# Patient Record
Sex: FEMALE | Race: WHITE | NOT HISPANIC OR LATINO | Employment: OTHER | URBAN - METROPOLITAN AREA
[De-identification: names, ages, dates, MRNs, and addresses within clinical notes are randomized per-mention and may not be internally consistent; named-entity substitution may affect disease eponyms.]

---

## 2017-12-01 ENCOUNTER — CONVERSION ENCOUNTER (OUTPATIENT)
Dept: MAMMOGRAPHY | Facility: HOSPITAL | Age: 62
End: 2017-12-01

## 2018-01-02 ENCOUNTER — CONVERSION ENCOUNTER (OUTPATIENT)
Dept: SURGERY | Facility: CLINIC | Age: 63
End: 2018-01-02

## 2018-01-02 ENCOUNTER — OFFICE VISIT CONVERTED (OUTPATIENT)
Dept: SURGERY | Facility: CLINIC | Age: 63
End: 2018-01-02
Attending: SURGERY

## 2018-03-08 ENCOUNTER — OFFICE VISIT CONVERTED (OUTPATIENT)
Dept: GASTROENTEROLOGY | Facility: CLINIC | Age: 63
End: 2018-03-08
Attending: PHYSICIAN ASSISTANT

## 2018-05-30 ENCOUNTER — OFFICE VISIT CONVERTED (OUTPATIENT)
Dept: GASTROENTEROLOGY | Facility: CLINIC | Age: 63
End: 2018-05-30
Attending: PHYSICIAN ASSISTANT

## 2019-01-10 ENCOUNTER — HOSPITAL ENCOUNTER (OUTPATIENT)
Dept: MAMMOGRAPHY | Facility: HOSPITAL | Age: 64
Discharge: HOME | End: 2019-01-10
Attending: INTERNAL MEDICINE

## 2019-04-04 ENCOUNTER — HOSPITAL ENCOUNTER (OUTPATIENT)
Dept: LAB | Facility: HOSPITAL | Age: 64
Discharge: HOME | End: 2019-04-04
Attending: INTERNAL MEDICINE

## 2019-04-04 LAB
APPEARANCE UR: CLEAR
BILIRUB UR QL: NEGATIVE
COLOR UR: YELLOW
CONV BACTERIA: NEGATIVE
CONV COLLECTION SOURCE (UA): ABNORMAL
CONV HYALINE CASTS IN URINE MICRO: ABNORMAL /[LPF]
CONV UROBILINOGEN IN URINE BY AUTOMATED TEST STRIP: 0.2 {EHRLICHU}/DL (ref 0.1–1)
GLUCOSE UR QL: NEGATIVE MG/DL
HGB UR QL STRIP: NEGATIVE
KETONES UR QL STRIP: NEGATIVE MG/DL
LEUKOCYTE ESTERASE UR QL STRIP: ABNORMAL
NITRITE UR QL STRIP: NEGATIVE
PH UR STRIP.AUTO: 5.5 [PH] (ref 5–8)
PROT UR QL: NEGATIVE MG/DL
RBC #/AREA URNS HPF: ABNORMAL /[HPF]
SP GR UR: 1.02 (ref 1–1.03)
WBC #/AREA URNS HPF: ABNORMAL /[HPF]

## 2019-04-06 LAB — BACTERIA UR CULT: NORMAL

## 2019-04-23 ENCOUNTER — HOSPITAL ENCOUNTER (OUTPATIENT)
Dept: CT IMAGING | Facility: HOSPITAL | Age: 64
Discharge: HOME | End: 2019-04-23
Attending: INTERNAL MEDICINE

## 2019-04-23 LAB
CREAT BLD-MCNC: 0.8 MG/DL (ref 0.6–1.4)
GFR SERPLBLD BASED ON 1.73 SQ M-ARVRAT: >60 ML/MIN/{1.73_M2}

## 2019-09-10 ENCOUNTER — HOSPITAL ENCOUNTER (OUTPATIENT)
Dept: SURGERY | Facility: HOSPITAL | Age: 64
Setting detail: HOSPITAL OUTPATIENT SURGERY
Discharge: HOME | End: 2019-09-10
Attending: OPHTHALMOLOGY

## 2019-10-30 ENCOUNTER — HOSPITAL ENCOUNTER (OUTPATIENT)
Dept: URGENT CARE | Facility: CLINIC | Age: 64
Discharge: HOME | End: 2019-10-30
Attending: EMERGENCY MEDICINE

## 2020-03-07 ENCOUNTER — HOSPITAL ENCOUNTER (OUTPATIENT)
Dept: OTHER | Facility: HOSPITAL | Age: 65
Discharge: HOME | End: 2020-03-07
Attending: INTERNAL MEDICINE

## 2020-03-07 LAB
25(OH)D3 SERPL-MCNC: 47 NG/ML (ref 30–100)
ALBUMIN SERPL-MCNC: 4.5 G/DL (ref 3.5–5)
ALBUMIN/GLOB SERPL: 1.7 {RATIO} (ref 1.4–2.6)
ALP SERPL-CCNC: 51 U/L (ref 43–160)
ALT SERPL-CCNC: 15 U/L (ref 10–40)
ANION GAP SERPL CALC-SCNC: 19 MMOL/L (ref 8–19)
AST SERPL-CCNC: 23 U/L (ref 15–50)
BASOPHILS # BLD AUTO: 0.04 10*3/UL (ref 0–0.2)
BASOPHILS NFR BLD AUTO: 0.7 % (ref 0–3)
BILIRUB SERPL-MCNC: 0.27 MG/DL (ref 0.2–1.3)
BUN SERPL-MCNC: 9 MG/DL (ref 5–25)
BUN/CREAT SERPL: 12 {RATIO} (ref 6–20)
CALCIUM SERPL-MCNC: 9.1 MG/DL (ref 8.7–10.4)
CHLORIDE SERPL-SCNC: 105 MMOL/L (ref 99–111)
CHOLEST SERPL-MCNC: 158 MG/DL (ref 107–200)
CHOLEST/HDLC SERPL: 3.7 {RATIO} (ref 3–6)
CONV ABS IMM GRAN: 0.01 10*3/UL (ref 0–0.2)
CONV CO2: 23 MMOL/L (ref 22–32)
CONV IMMATURE GRAN: 0.2 % (ref 0–1.8)
CONV TOTAL PROTEIN: 7.2 G/DL (ref 6.3–8.2)
CREAT UR-MCNC: 0.75 MG/DL (ref 0.5–0.9)
DEPRECATED RDW RBC AUTO: 43.9 FL (ref 36.4–46.3)
EOSINOPHIL # BLD AUTO: 0.16 10*3/UL (ref 0–0.7)
EOSINOPHIL # BLD AUTO: 2.7 % (ref 0–7)
ERYTHROCYTE [DISTWIDTH] IN BLOOD BY AUTOMATED COUNT: 12.2 % (ref 11.7–14.4)
FOLATE SERPL-MCNC: >20 NG/ML (ref 4.8–20)
GFR SERPLBLD BASED ON 1.73 SQ M-ARVRAT: >60 ML/MIN/{1.73_M2}
GLOBULIN UR ELPH-MCNC: 2.7 G/DL (ref 2–3.5)
GLUCOSE SERPL-MCNC: 89 MG/DL (ref 65–99)
HCT VFR BLD AUTO: 40.8 % (ref 37–47)
HDLC SERPL-MCNC: 43 MG/DL (ref 40–60)
HGB BLD-MCNC: 13.4 G/DL (ref 12–16)
LDLC SERPL CALC-MCNC: 65 MG/DL (ref 70–100)
LYMPHOCYTES # BLD AUTO: 1.8 10*3/UL (ref 1–5)
LYMPHOCYTES NFR BLD AUTO: 30.7 % (ref 20–45)
MCH RBC QN AUTO: 31.9 PG (ref 27–31)
MCHC RBC AUTO-ENTMCNC: 32.8 G/DL (ref 33–37)
MCV RBC AUTO: 97.1 FL (ref 81–99)
MONOCYTES # BLD AUTO: 0.54 10*3/UL (ref 0.2–1.2)
MONOCYTES NFR BLD AUTO: 9.2 % (ref 3–10)
NEUTROPHILS # BLD AUTO: 3.32 10*3/UL (ref 2–8)
NEUTROPHILS NFR BLD AUTO: 56.5 % (ref 30–85)
NRBC CBCN: 0 % (ref 0–0.7)
OSMOLALITY SERPL CALC.SUM OF ELEC: 294 MOSM/KG (ref 273–304)
PLATELET # BLD AUTO: 195 10*3/UL (ref 130–400)
PMV BLD AUTO: 12.5 FL (ref 9.4–12.3)
POTASSIUM SERPL-SCNC: 4 MMOL/L (ref 3.5–5.3)
RBC # BLD AUTO: 4.2 10*6/UL (ref 4.2–5.4)
SODIUM SERPL-SCNC: 143 MMOL/L (ref 135–147)
T4 FREE SERPL-MCNC: 0.9 NG/DL (ref 0.9–1.8)
TRIGL SERPL-MCNC: 251 MG/DL (ref 40–150)
TSH SERPL-ACNC: 1.08 M[IU]/L (ref 0.27–4.2)
VIT B12 SERPL-MCNC: 580 PG/ML (ref 211–911)
VLDLC SERPL-MCNC: 50 MG/DL (ref 5–37)
WBC # BLD AUTO: 5.87 10*3/UL (ref 4.8–10.8)

## 2020-06-23 ENCOUNTER — HOSPITAL ENCOUNTER (OUTPATIENT)
Dept: MAMMOGRAPHY | Facility: HOSPITAL | Age: 65
Discharge: HOME | End: 2020-06-23
Attending: INTERNAL MEDICINE

## 2020-08-11 ENCOUNTER — HOSPITAL ENCOUNTER (OUTPATIENT)
Dept: LAB | Facility: HOSPITAL | Age: 65
Discharge: HOME | End: 2020-08-11
Attending: INTERNAL MEDICINE

## 2020-08-11 LAB
ALBUMIN SERPL-MCNC: 4.4 G/DL (ref 3.5–5)
ALBUMIN/GLOB SERPL: 1.5 {RATIO} (ref 1.4–2.6)
ALP SERPL-CCNC: 53 U/L (ref 43–160)
ALT SERPL-CCNC: 14 U/L (ref 10–40)
ANION GAP SERPL CALC-SCNC: 14 MMOL/L (ref 8–19)
AST SERPL-CCNC: 22 U/L (ref 15–50)
BILIRUB SERPL-MCNC: 0.33 MG/DL (ref 0.2–1.3)
BUN SERPL-MCNC: 9 MG/DL (ref 5–25)
BUN/CREAT SERPL: 11 {RATIO} (ref 6–20)
CALCIUM SERPL-MCNC: 9.9 MG/DL (ref 8.7–10.4)
CHLORIDE SERPL-SCNC: 104 MMOL/L (ref 99–111)
CHOLEST SERPL-MCNC: 155 MG/DL (ref 107–200)
CHOLEST/HDLC SERPL: 3.7 {RATIO} (ref 3–6)
CONV CO2: 27 MMOL/L (ref 22–32)
CONV TOTAL PROTEIN: 7.3 G/DL (ref 6.3–8.2)
CREAT UR-MCNC: 0.79 MG/DL (ref 0.5–0.9)
GFR SERPLBLD BASED ON 1.73 SQ M-ARVRAT: >60 ML/MIN/{1.73_M2}
GLOBULIN UR ELPH-MCNC: 2.9 G/DL (ref 2–3.5)
GLUCOSE SERPL-MCNC: 87 MG/DL (ref 65–99)
HDLC SERPL-MCNC: 42 MG/DL (ref 40–60)
LDLC SERPL CALC-MCNC: 71 MG/DL (ref 70–100)
OSMOLALITY SERPL CALC.SUM OF ELEC: 290 MOSM/KG (ref 273–304)
POTASSIUM SERPL-SCNC: 4.1 MMOL/L (ref 3.5–5.3)
SODIUM SERPL-SCNC: 141 MMOL/L (ref 135–147)
TRIGL SERPL-MCNC: 208 MG/DL (ref 40–150)
VLDLC SERPL-MCNC: 42 MG/DL (ref 5–37)

## 2020-08-17 ENCOUNTER — HOSPITAL ENCOUNTER (OUTPATIENT)
Dept: LAB | Facility: HOSPITAL | Age: 65
Discharge: HOME | End: 2020-08-17
Attending: INTERNAL MEDICINE

## 2020-08-17 LAB
BASOPHILS # BLD AUTO: 0.05 10*3/UL (ref 0–0.2)
BASOPHILS NFR BLD AUTO: 0.8 % (ref 0–3)
CONV ABS IMM GRAN: 0.01 10*3/UL (ref 0–0.2)
CONV IMMATURE GRAN: 0.2 % (ref 0–1.8)
DEPRECATED RDW RBC AUTO: 43.8 FL (ref 36.4–46.3)
EOSINOPHIL # BLD AUTO: 0.05 10*3/UL (ref 0–0.7)
EOSINOPHIL # BLD AUTO: 0.8 % (ref 0–7)
ERYTHROCYTE [DISTWIDTH] IN BLOOD BY AUTOMATED COUNT: 12.1 % (ref 11.7–14.4)
HCT VFR BLD AUTO: 42.5 % (ref 37–47)
HGB BLD-MCNC: 13.8 G/DL (ref 12–16)
LYMPHOCYTES # BLD AUTO: 1.86 10*3/UL (ref 1–5)
LYMPHOCYTES NFR BLD AUTO: 30.1 % (ref 20–45)
MCH RBC QN AUTO: 31.8 PG (ref 27–31)
MCHC RBC AUTO-ENTMCNC: 32.5 G/DL (ref 33–37)
MCV RBC AUTO: 97.9 FL (ref 81–99)
MONOCYTES # BLD AUTO: 0.53 10*3/UL (ref 0.2–1.2)
MONOCYTES NFR BLD AUTO: 8.6 % (ref 3–10)
NEUTROPHILS # BLD AUTO: 3.68 10*3/UL (ref 2–8)
NEUTROPHILS NFR BLD AUTO: 59.5 % (ref 30–85)
NRBC CBCN: 0 % (ref 0–0.7)
PLATELET # BLD AUTO: 192 10*3/UL (ref 130–400)
PMV BLD AUTO: 13 FL (ref 9.4–12.3)
RBC # BLD AUTO: 4.34 10*6/UL (ref 4.2–5.4)
WBC # BLD AUTO: 6.18 10*3/UL (ref 4.8–10.8)

## 2020-08-18 ENCOUNTER — HOSPITAL ENCOUNTER (OUTPATIENT)
Dept: GENERAL RADIOLOGY | Facility: HOSPITAL | Age: 65
Discharge: HOME | End: 2020-08-18
Attending: INTERNAL MEDICINE

## 2020-08-24 ENCOUNTER — HOSPITAL ENCOUNTER (OUTPATIENT)
Dept: PREADMISSION TESTING | Facility: HOSPITAL | Age: 65
Discharge: HOME | End: 2020-08-24
Attending: INTERNAL MEDICINE

## 2020-08-25 LAB — SARS-COV-2 RNA SPEC QL NAA+PROBE: NOT DETECTED

## 2020-08-28 ENCOUNTER — HOSPITAL ENCOUNTER (OUTPATIENT)
Dept: CARDIOLOGY | Facility: HOSPITAL | Age: 65
Discharge: HOME | End: 2020-08-28
Attending: INTERNAL MEDICINE

## 2020-09-16 ENCOUNTER — HOSPITAL ENCOUNTER (OUTPATIENT)
Dept: URGENT CARE | Facility: CLINIC | Age: 65
Discharge: HOME | End: 2020-09-16
Attending: FAMILY MEDICINE

## 2020-09-16 LAB — GLUCOSE BLD-MCNC: 83 MG/DL (ref 65–99)

## 2020-10-22 ENCOUNTER — HOSPITAL ENCOUNTER (OUTPATIENT)
Dept: URGENT CARE | Facility: CLINIC | Age: 65
Discharge: HOME | End: 2020-10-22
Attending: PHYSICIAN ASSISTANT

## 2020-11-22 ENCOUNTER — HOSPITAL ENCOUNTER (OUTPATIENT)
Dept: URGENT CARE | Facility: CLINIC | Age: 65
Discharge: HOME | End: 2020-11-22

## 2020-11-27 LAB — SARS-COV-2 RNA SPEC QL NAA+PROBE: DETECTED

## 2021-03-25 ENCOUNTER — HOSPITAL ENCOUNTER (OUTPATIENT)
Dept: CARDIOLOGY | Facility: HOSPITAL | Age: 66
Discharge: HOME | End: 2021-03-25
Attending: INTERNAL MEDICINE

## 2021-03-30 ENCOUNTER — HOSPITAL ENCOUNTER (OUTPATIENT)
Dept: LAB | Facility: HOSPITAL | Age: 66
Discharge: HOME | End: 2021-03-30
Attending: INTERNAL MEDICINE

## 2021-03-30 LAB
25(OH)D3 SERPL-MCNC: 43.8 NG/ML (ref 30–100)
ALBUMIN SERPL-MCNC: 4.4 G/DL (ref 3.5–5)
ALBUMIN/GLOB SERPL: 1.4 {RATIO} (ref 1.4–2.6)
ALP SERPL-CCNC: 47 U/L (ref 43–160)
ALT SERPL-CCNC: 12 U/L (ref 10–40)
ANION GAP SERPL CALC-SCNC: 13 MMOL/L (ref 8–19)
AST SERPL-CCNC: 20 U/L (ref 15–50)
BASOPHILS # BLD AUTO: 0.04 10*3/UL (ref 0–0.2)
BASOPHILS NFR BLD AUTO: 0.8 % (ref 0–3)
BILIRUB SERPL-MCNC: 0.39 MG/DL (ref 0.2–1.3)
BUN SERPL-MCNC: 12 MG/DL (ref 5–25)
BUN/CREAT SERPL: 15 {RATIO} (ref 6–20)
CALCIUM SERPL-MCNC: 9.3 MG/DL (ref 8.7–10.4)
CHLORIDE SERPL-SCNC: 103 MMOL/L (ref 99–111)
CHOLEST SERPL-MCNC: 144 MG/DL (ref 107–200)
CHOLEST/HDLC SERPL: 2.9 {RATIO} (ref 3–6)
CONV ABS IMM GRAN: 0.01 10*3/UL (ref 0–0.2)
CONV CO2: 26 MMOL/L (ref 22–32)
CONV IMMATURE GRAN: 0.2 % (ref 0–1.8)
CONV TOTAL PROTEIN: 7.5 G/DL (ref 6.3–8.2)
CREAT UR-MCNC: 0.78 MG/DL (ref 0.5–0.9)
DEPRECATED RDW RBC AUTO: 43.2 FL (ref 36.4–46.3)
EOSINOPHIL # BLD AUTO: 0.08 10*3/UL (ref 0–0.7)
EOSINOPHIL # BLD AUTO: 1.7 % (ref 0–7)
ERYTHROCYTE [DISTWIDTH] IN BLOOD BY AUTOMATED COUNT: 12.2 % (ref 11.7–14.4)
FOLATE SERPL-MCNC: >20 NG/ML (ref 4.8–20)
GFR SERPLBLD BASED ON 1.73 SQ M-ARVRAT: >60 ML/MIN/{1.73_M2}
GLOBULIN UR ELPH-MCNC: 3.1 G/DL (ref 2–3.5)
GLUCOSE SERPL-MCNC: 85 MG/DL (ref 65–99)
HCT VFR BLD AUTO: 40.4 % (ref 37–47)
HDLC SERPL-MCNC: 49 MG/DL (ref 40–60)
HGB BLD-MCNC: 13.3 G/DL (ref 12–16)
LDLC SERPL CALC-MCNC: 71 MG/DL (ref 70–100)
LYMPHOCYTES # BLD AUTO: 1.65 10*3/UL (ref 1–5)
LYMPHOCYTES NFR BLD AUTO: 34.3 % (ref 20–45)
MCH RBC QN AUTO: 32 PG (ref 27–31)
MCHC RBC AUTO-ENTMCNC: 32.9 G/DL (ref 33–37)
MCV RBC AUTO: 97.1 FL (ref 81–99)
MONOCYTES # BLD AUTO: 0.5 10*3/UL (ref 0.2–1.2)
MONOCYTES NFR BLD AUTO: 10.4 % (ref 3–10)
NEUTROPHILS # BLD AUTO: 2.53 10*3/UL (ref 2–8)
NEUTROPHILS NFR BLD AUTO: 52.6 % (ref 30–85)
NRBC CBCN: 0 % (ref 0–0.7)
OSMOLALITY SERPL CALC.SUM OF ELEC: 285 MOSM/KG (ref 273–304)
PLATELET # BLD AUTO: 184 10*3/UL (ref 130–400)
PMV BLD AUTO: 12.6 FL (ref 9.4–12.3)
POTASSIUM SERPL-SCNC: 3.8 MMOL/L (ref 3.5–5.3)
RBC # BLD AUTO: 4.16 10*6/UL (ref 4.2–5.4)
SODIUM SERPL-SCNC: 138 MMOL/L (ref 135–147)
T4 FREE SERPL-MCNC: 1.2 NG/DL (ref 0.9–1.8)
TRIGL SERPL-MCNC: 122 MG/DL (ref 40–150)
TSH SERPL-ACNC: 2.49 M[IU]/L (ref 0.27–4.2)
VIT B12 SERPL-MCNC: 698 PG/ML (ref 211–911)
VLDLC SERPL-MCNC: 24 MG/DL (ref 5–37)
WBC # BLD AUTO: 4.81 10*3/UL (ref 4.8–10.8)

## 2021-04-15 ENCOUNTER — HOSPITAL ENCOUNTER (OUTPATIENT)
Dept: CARDIOLOGY | Facility: HOSPITAL | Age: 66
Discharge: HOME | End: 2021-04-15
Attending: INTERNAL MEDICINE

## 2021-05-16 VITALS
HEART RATE: 85 BPM | SYSTOLIC BLOOD PRESSURE: 119 MMHG | HEIGHT: 64 IN | OXYGEN SATURATION: 100 % | WEIGHT: 156.44 LBS | DIASTOLIC BLOOD PRESSURE: 60 MMHG | RESPIRATION RATE: 12 BRPM | BODY MASS INDEX: 26.71 KG/M2

## 2021-05-16 VITALS
BODY MASS INDEX: 26.46 KG/M2 | WEIGHT: 155 LBS | HEIGHT: 64 IN | DIASTOLIC BLOOD PRESSURE: 69 MMHG | OXYGEN SATURATION: 99 % | SYSTOLIC BLOOD PRESSURE: 107 MMHG | HEART RATE: 89 BPM

## 2021-05-16 VITALS — WEIGHT: 160 LBS | BODY MASS INDEX: 27.31 KG/M2 | HEIGHT: 64 IN | RESPIRATION RATE: 12 BRPM

## 2021-05-22 ENCOUNTER — TRANSCRIBE ORDERS (OUTPATIENT)
Dept: ADMINISTRATIVE | Facility: HOSPITAL | Age: 66
End: 2021-05-22

## 2021-05-22 DIAGNOSIS — Z12.31 BREAST CANCER SCREENING BY MAMMOGRAM: Primary | ICD-10-CM

## 2021-06-04 ENCOUNTER — TRANSCRIBE ORDERS (OUTPATIENT)
Dept: ADMINISTRATIVE | Facility: HOSPITAL | Age: 66
End: 2021-06-04

## 2021-06-04 DIAGNOSIS — M89.8X8 MASS OF PETROUS TEMPORAL BONE: Primary | ICD-10-CM

## 2021-06-04 DIAGNOSIS — J34.89 MASS OF LEFT SPHENOID SINUS: ICD-10-CM

## 2021-07-01 ENCOUNTER — HOSPITAL ENCOUNTER (OUTPATIENT)
Dept: MAMMOGRAPHY | Facility: HOSPITAL | Age: 66
Discharge: HOME | End: 2021-07-01
Admitting: INTERNAL MEDICINE

## 2021-07-01 DIAGNOSIS — Z12.31 BREAST CANCER SCREENING BY MAMMOGRAM: ICD-10-CM

## 2021-07-01 PROCEDURE — 77063 BREAST TOMOSYNTHESIS BI: CPT

## 2021-07-01 PROCEDURE — 77067 SCR MAMMO BI INCL CAD: CPT

## 2021-07-20 PROBLEM — E78.00 HIGH CHOLESTEROL: Status: ACTIVE | Noted: 2021-07-20

## 2021-09-15 ENCOUNTER — TRANSCRIBE ORDERS (OUTPATIENT)
Dept: LAB | Facility: HOSPITAL | Age: 66
End: 2021-09-15

## 2021-09-15 ENCOUNTER — LAB (OUTPATIENT)
Dept: LAB | Facility: HOSPITAL | Age: 66
End: 2021-09-15

## 2021-09-15 DIAGNOSIS — E87.6 HYPOPOTASSEMIA: Primary | ICD-10-CM

## 2021-09-15 DIAGNOSIS — E87.6 HYPOPOTASSEMIA: ICD-10-CM

## 2021-09-15 LAB
BACTERIA UR QL AUTO: NORMAL /HPF
BILIRUB UR QL STRIP: NEGATIVE
CLARITY UR: CLEAR
COLOR UR: YELLOW
GLUCOSE UR STRIP-MCNC: NEGATIVE MG/DL
HGB UR QL STRIP.AUTO: NEGATIVE
HYALINE CASTS UR QL AUTO: NORMAL /LPF
KETONES UR QL STRIP: NEGATIVE
LEUKOCYTE ESTERASE UR QL STRIP.AUTO: ABNORMAL
NITRITE UR QL STRIP: NEGATIVE
OSMOLALITY UR: 261 MOSM/KG
PH UR STRIP.AUTO: 7.5 [PH] (ref 5–8)
PROT UR QL STRIP: NEGATIVE
RBC # UR: NORMAL /HPF
REF LAB TEST METHOD: NORMAL
SP GR UR STRIP: 1.01 (ref 1–1.03)
SQUAMOUS #/AREA URNS HPF: NORMAL /HPF
UROBILINOGEN UR QL STRIP: ABNORMAL
WBC UR QL AUTO: NORMAL /HPF

## 2021-09-15 PROCEDURE — 80048 BASIC METABOLIC PNL TOTAL CA: CPT

## 2021-09-15 PROCEDURE — 81001 URINALYSIS AUTO W/SCOPE: CPT

## 2021-09-15 PROCEDURE — 83935 ASSAY OF URINE OSMOLALITY: CPT

## 2021-09-15 PROCEDURE — 36415 COLL VENOUS BLD VENIPUNCTURE: CPT

## 2021-09-16 LAB
ANION GAP SERPL CALCULATED.3IONS-SCNC: 10.6 MMOL/L (ref 5–15)
BUN SERPL-MCNC: 11 MG/DL (ref 8–23)
BUN/CREAT SERPL: 12.8 (ref 7–25)
CALCIUM SPEC-SCNC: 8.8 MG/DL (ref 8.6–10.5)
CHLORIDE SERPL-SCNC: 95 MMOL/L (ref 98–107)
CO2 SERPL-SCNC: 28.4 MMOL/L (ref 22–29)
CREAT SERPL-MCNC: 0.86 MG/DL (ref 0.57–1)
GFR SERPL CREATININE-BSD FRML MDRD: 66 ML/MIN/1.73
GLUCOSE SERPL-MCNC: 92 MG/DL (ref 65–99)
POTASSIUM SERPL-SCNC: 3.8 MMOL/L (ref 3.5–5.2)
SODIUM SERPL-SCNC: 134 MMOL/L (ref 136–145)

## 2021-10-26 ENCOUNTER — OFFICE VISIT (OUTPATIENT)
Dept: CARDIOLOGY | Facility: CLINIC | Age: 66
End: 2021-10-26

## 2021-10-26 VITALS
DIASTOLIC BLOOD PRESSURE: 72 MMHG | HEIGHT: 63 IN | WEIGHT: 157 LBS | BODY MASS INDEX: 27.82 KG/M2 | SYSTOLIC BLOOD PRESSURE: 110 MMHG | HEART RATE: 74 BPM

## 2021-10-26 DIAGNOSIS — E78.2 HYPERLIPEMIA, MIXED: Primary | ICD-10-CM

## 2021-10-26 DIAGNOSIS — R00.2 PALPITATIONS: ICD-10-CM

## 2021-10-26 PROCEDURE — 93000 ELECTROCARDIOGRAM COMPLETE: CPT | Performed by: SPECIALIST

## 2021-10-26 PROCEDURE — 99204 OFFICE O/P NEW MOD 45 MIN: CPT | Performed by: SPECIALIST

## 2021-10-26 RX ORDER — MULTIPLE VITAMINS W/ MINERALS TAB 9MG-400MCG
TAB ORAL
COMMUNITY
End: 2022-03-29

## 2021-10-26 RX ORDER — ALBUTEROL SULFATE 90 UG/1
2 AEROSOL, METERED RESPIRATORY (INHALATION) EVERY 4 HOURS PRN
COMMUNITY
Start: 2021-08-31 | End: 2021-12-13

## 2021-10-26 RX ORDER — METOPROLOL SUCCINATE 25 MG/1
25 TABLET, EXTENDED RELEASE ORAL DAILY
Qty: 90 TABLET | Refills: 3 | Status: SHIPPED | OUTPATIENT
Start: 2021-10-26 | End: 2022-06-07 | Stop reason: ALTCHOICE

## 2021-10-26 NOTE — PROGRESS NOTES
[facility]   Cardiology Consult Note    Patient Name: Archana Delvalle  : 1955  Referring Physician: No ref. provider found  Subjective   Subjective     Reason for Consult/ Chief Complaint:   Tachycardia, dizziness  HPI:  Archana Delvalle is a 66 y.o. female history of her pituitary adenoma status post surgery.  She had dizziness secondary to that which is since improved.  Her heart rate runs high at times in the 100s.  Occasional palpitations.  No chest pain or shortness of breath.    Review of Systems:   Constitutional no fever,  no weight loss   Skin no rash   Otolaryngeal no difficulty swallowing   Cardiovascular See HPI   Pulmonary no cough, no sputum production   Gastrointestinal no constipation, no diarrhea   Genitourinary no dysuria, no hematuria   Hematologic no easy bruisability, no abnormal bleeding   Musculoskeletal no muscle pain   Neurologic no dizziness, no falls     Personal History     Past Medical History:  Past Medical History:   Diagnosis Date   • Asthma    • High cholesterol        Family History:   Family History   Problem Relation Age of Onset   • Diabetes Father         Unspecified type   • Prostate cancer Father        Social History:  reports that she has never smoked. She has never used smokeless tobacco. She reports current alcohol use. She reports that she does not use drugs.    Home Medications:  albuterol sulfate HFA, cefdinir, cephalexin, escitalopram, multivitamin with minerals, predniSONE, and rosuvastatin    Allergies:  Allergies   Allergen Reactions   • Codeine Provider Review Needed   • Tetracycline Provider Review Needed       Objective    Objective     Vitals:   Heart Rate:  [74] 74  BP: (110)/(72) 110/72  Body mass index is 27.81 kg/m².  Physical Exam:   Constitutional: Awake, alert, No acute distress    Eyes: PERRLA, sclerae anicteric, no conjunctival injection   HENT: NCAT, mucous membranes moist   Neck: Supple, no thyromegaly, no lymphadenopathy, trachea  midline   Respiratory: Clear to auscultation bilaterally, nonlabored respirations    Cardiovascular: RRR, no murmurs or rubs. Palpable pedal pulses bilaterally   Gastrointestinal: Positive bowel sounds, soft, nontender, nondistended   Musculoskeletal: No bilateral ankle edema, no clubbing or cyanosis to extremities   Psychiatric: Appropriate affect, cooperative   Neurologic: Oriented x 3, strength symmetric in all extremities, Cranial Nerves grossly intact to confrontation, speech clear   Skin: No rashes     Result Review    Result Review:  I have personally reviewed the available results:  [x]  Laboratory  [x]  EKG/Telemetry   [x]  Cardiology/Vascular   [x] Medications  [x]  Old records  No results found for: CHOL  Lab Results   Component Value Date    TRIG 122 03/30/2021    TRIG 208 (H) 08/11/2020    TRIG 251 (H) 03/07/2020     Lab Results   Component Value Date    HDL 49 03/30/2021    HDL 42 08/11/2020    HDL 43 03/07/2020     Lab Results   Component Value Date    LDL 71 03/30/2021    LDL 71 08/11/2020    LDL 65 (L) 03/07/2020     Lab Results   Component Value Date    VLDL 24 03/30/2021    VLDL 42 (H) 08/11/2020    VLDL 50 (H) 03/07/2020               ECG 12 Lead    Date/Time: 10/26/2021 10:51 AM  Performed by: Brennen Joe MD  Authorized by: Brennen Joe MD   Rhythm: sinus rhythm  Conduction: incomplete right bundle branch block    Clinical impression: abnormal EKG             Impression/Plan  1.  Palpitations/tachycardia: Reviewed her 24-hour Holter report.  Shows short runs of supraventricular tachycardia.  Start Toprol-XL 25 mg once a day and see if she is able to tolerate it.  Stress echo was negative with a normal left ventricular systolic function.  2.  Hyperlipidemia: Continue Crestor 5 mg once a day.  Lipid profile reviewed which shows an LDL of 71.  Able to tolerate Crestor without any side effects.  3.  Pituitary adenoma status post surgery: Stable        Electronically signed by  Brennen Joe MD, 10/26/21, 10:07 AM EDT.

## 2021-10-28 RX ORDER — ROSUVASTATIN CALCIUM 10 MG/1
TABLET, COATED ORAL
Qty: 90 TABLET | Refills: 3 | Status: SHIPPED | OUTPATIENT
Start: 2021-10-28 | End: 2022-06-07 | Stop reason: ALTCHOICE

## 2021-10-28 RX ORDER — ROSUVASTATIN CALCIUM 10 MG/1
5 TABLET, COATED ORAL DAILY
Qty: 90 TABLET | Refills: 3 | Status: CANCELLED | OUTPATIENT
Start: 2021-10-28

## 2021-12-13 RX ORDER — ALBUTEROL SULFATE 90 UG/1
AEROSOL, METERED RESPIRATORY (INHALATION)
Qty: 8.5 G | Refills: 1 | Status: SHIPPED | OUTPATIENT
Start: 2021-12-13

## 2022-01-03 ENCOUNTER — HOSPITAL ENCOUNTER (OUTPATIENT)
Dept: GENERAL RADIOLOGY | Facility: HOSPITAL | Age: 67
Discharge: HOME | End: 2022-01-03
Admitting: INTERNAL MEDICINE

## 2022-01-03 ENCOUNTER — OFFICE VISIT (OUTPATIENT)
Dept: INTERNAL MEDICINE | Facility: CLINIC | Age: 67
End: 2022-01-03

## 2022-01-03 VITALS
HEART RATE: 102 BPM | BODY MASS INDEX: 26.77 KG/M2 | TEMPERATURE: 98.4 F | SYSTOLIC BLOOD PRESSURE: 132 MMHG | HEIGHT: 64 IN | RESPIRATION RATE: 16 BRPM | OXYGEN SATURATION: 97 % | DIASTOLIC BLOOD PRESSURE: 80 MMHG | WEIGHT: 156.8 LBS

## 2022-01-03 DIAGNOSIS — Z86.018 HISTORY OF PITUITARY ADENOMA: ICD-10-CM

## 2022-01-03 DIAGNOSIS — R53.83 FATIGUE, UNSPECIFIED TYPE: ICD-10-CM

## 2022-01-03 DIAGNOSIS — R00.2 PALPITATIONS: ICD-10-CM

## 2022-01-03 DIAGNOSIS — R93.89 ABNORMAL CXR: ICD-10-CM

## 2022-01-03 DIAGNOSIS — E53.8 B12 DEFICIENCY: ICD-10-CM

## 2022-01-03 DIAGNOSIS — E78.2 HYPERLIPEMIA, MIXED: ICD-10-CM

## 2022-01-03 DIAGNOSIS — R93.89 ABNORMAL CXR: Primary | ICD-10-CM

## 2022-01-03 DIAGNOSIS — J45.901 ASTHMA EXACERBATION, MILD: ICD-10-CM

## 2022-01-03 DIAGNOSIS — E89.3 STATUS POST TRANSSPHENOIDAL PITUITARY RESECTION: ICD-10-CM

## 2022-01-03 DIAGNOSIS — E55.9 VITAMIN D DEFICIENCY: ICD-10-CM

## 2022-01-03 PROCEDURE — 99214 OFFICE O/P EST MOD 30 MIN: CPT | Performed by: INTERNAL MEDICINE

## 2022-01-03 PROCEDURE — 71046 X-RAY EXAM CHEST 2 VIEWS: CPT

## 2022-01-03 RX ORDER — CEFUROXIME AXETIL 500 MG/1
500 TABLET ORAL 2 TIMES DAILY
Qty: 20 TABLET | Refills: 0 | Status: SHIPPED | OUTPATIENT
Start: 2022-01-03 | End: 2022-01-20

## 2022-01-03 RX ORDER — BUDESONIDE AND FORMOTEROL FUMARATE DIHYDRATE 160; 4.5 UG/1; UG/1
2 AEROSOL RESPIRATORY (INHALATION)
Qty: 1 EACH | Refills: 3 | Status: SHIPPED | OUTPATIENT
Start: 2022-01-03 | End: 2022-01-20

## 2022-01-04 NOTE — ASSESSMENT & PLAN NOTE
Patient has a history of pituitary adenoma that was detected on a routine MRI for sinusitis by ENT.  I referred her to Dr. Leahy.  He subsequently did transsphenoidal resection of large pituitary adenoma.  Minimal residual left.  Patient's had a repeat MRI and no change since surgery.  She has recovered from that well.  She had a panel of endocrine test done there were within normal limits after the surgery apparently.

## 2022-01-04 NOTE — ASSESSMENT & PLAN NOTE
Patient has asthma that was flared up by acute upper respiratory infection.  She had a Covid test that was negative.  However Omicron is predominant variant of Covid and is not always picked up by the Covid test.  Had mild wheeze today.  Coughing up yellow-green sputum.  Assessment: Acute bronchitis and exacerbation of asthma.    Plan: Add Ceftin 500 mg twice daily,  Symbicort 2 puffs twice daily  Chest x-ray today  She has an albuterol rescue inhaler to use as needed.  RTO in a couple weeks for follow-up.

## 2022-01-04 NOTE — ASSESSMENT & PLAN NOTE
Patient tolerating Crestor 10 mg daily for cholesterol.  Last cholesterol on the chart was adequately controlled.  Assessment: Hyperlipidemia controlled.  Plan: Continue Crestor 10 mg.

## 2022-01-17 ENCOUNTER — LAB (OUTPATIENT)
Dept: LAB | Facility: HOSPITAL | Age: 67
End: 2022-01-17

## 2022-01-17 DIAGNOSIS — E53.8 B12 DEFICIENCY: ICD-10-CM

## 2022-01-17 DIAGNOSIS — Z86.018 HISTORY OF PITUITARY ADENOMA: ICD-10-CM

## 2022-01-17 DIAGNOSIS — E89.3 STATUS POST TRANSSPHENOIDAL PITUITARY RESECTION: ICD-10-CM

## 2022-01-17 DIAGNOSIS — E55.9 VITAMIN D DEFICIENCY: ICD-10-CM

## 2022-01-17 DIAGNOSIS — E78.2 HYPERLIPEMIA, MIXED: ICD-10-CM

## 2022-01-17 DIAGNOSIS — R53.83 FATIGUE, UNSPECIFIED TYPE: ICD-10-CM

## 2022-01-17 LAB
25(OH)D3 SERPL-MCNC: 34.9 NG/ML (ref 30–100)
ALBUMIN SERPL-MCNC: 4.4 G/DL (ref 3.5–5.2)
ALBUMIN/GLOB SERPL: 1.5 G/DL
ALP SERPL-CCNC: 61 U/L (ref 39–117)
ALT SERPL W P-5'-P-CCNC: 18 U/L (ref 1–33)
ANION GAP SERPL CALCULATED.3IONS-SCNC: 14.4 MMOL/L (ref 5–15)
AST SERPL-CCNC: 25 U/L (ref 1–32)
BACTERIA UR QL AUTO: ABNORMAL /HPF
BASOPHILS # BLD AUTO: 0.04 10*3/MM3 (ref 0–0.2)
BASOPHILS NFR BLD AUTO: 0.8 % (ref 0–1.5)
BILIRUB SERPL-MCNC: 0.4 MG/DL (ref 0–1.2)
BUN SERPL-MCNC: 9 MG/DL (ref 8–23)
BUN/CREAT SERPL: 13.2 (ref 7–25)
CALCIUM SPEC-SCNC: 9.2 MG/DL (ref 8.6–10.5)
CHLORIDE SERPL-SCNC: 102 MMOL/L (ref 98–107)
CHOLEST SERPL-MCNC: 169 MG/DL (ref 0–200)
CO2 SERPL-SCNC: 24.6 MMOL/L (ref 22–29)
CORTIS SERPL-MCNC: 9.13 MCG/DL
CREAT SERPL-MCNC: 0.68 MG/DL (ref 0.57–1)
DEPRECATED RDW RBC AUTO: 44.8 FL (ref 37–54)
EOSINOPHIL # BLD AUTO: 0.1 10*3/MM3 (ref 0–0.4)
EOSINOPHIL NFR BLD AUTO: 2 % (ref 0.3–6.2)
ERYTHROCYTE [DISTWIDTH] IN BLOOD BY AUTOMATED COUNT: 13 % (ref 12.3–15.4)
FOLATE SERPL-MCNC: >20 NG/ML (ref 4.78–24.2)
FSH SERPL-ACNC: 58.7 MIU/ML
GFR SERPL CREATININE-BSD FRML MDRD: 87 ML/MIN/1.73
GLOBULIN UR ELPH-MCNC: 2.9 GM/DL
GLUCOSE SERPL-MCNC: 93 MG/DL (ref 65–99)
HCT VFR BLD AUTO: 38.3 % (ref 34–46.6)
HDLC SERPL-MCNC: 50 MG/DL (ref 40–60)
HGB BLD-MCNC: 12.7 G/DL (ref 12–15.9)
HYALINE CASTS UR QL AUTO: ABNORMAL /LPF
IMM GRANULOCYTES # BLD AUTO: 0.01 10*3/MM3 (ref 0–0.05)
IMM GRANULOCYTES NFR BLD AUTO: 0.2 % (ref 0–0.5)
LDLC SERPL CALC-MCNC: 98 MG/DL (ref 0–100)
LDLC/HDLC SERPL: 1.92 {RATIO}
LH SERPL-ACNC: 19.7 MIU/ML
LYMPHOCYTES # BLD AUTO: 1.64 10*3/MM3 (ref 0.7–3.1)
LYMPHOCYTES NFR BLD AUTO: 32.7 % (ref 19.6–45.3)
MCH RBC QN AUTO: 31.5 PG (ref 26.6–33)
MCHC RBC AUTO-ENTMCNC: 33.2 G/DL (ref 31.5–35.7)
MCV RBC AUTO: 95 FL (ref 79–97)
MONOCYTES # BLD AUTO: 0.5 10*3/MM3 (ref 0.1–0.9)
MONOCYTES NFR BLD AUTO: 10 % (ref 5–12)
NEUTROPHILS NFR BLD AUTO: 2.73 10*3/MM3 (ref 1.7–7)
NEUTROPHILS NFR BLD AUTO: 54.3 % (ref 42.7–76)
NRBC BLD AUTO-RTO: 0 /100 WBC (ref 0–0.2)
PLATELET # BLD AUTO: 244 10*3/MM3 (ref 140–450)
PMV BLD AUTO: 11.3 FL (ref 6–12)
POTASSIUM SERPL-SCNC: 3.8 MMOL/L (ref 3.5–5.2)
PROLACTIN SERPL-MCNC: 8.23 NG/ML (ref 4.79–23.3)
PROT SERPL-MCNC: 7.3 G/DL (ref 6–8.5)
RBC # BLD AUTO: 4.03 10*6/MM3 (ref 3.77–5.28)
RBC # UR STRIP: ABNORMAL /HPF
REF LAB TEST METHOD: ABNORMAL
SODIUM SERPL-SCNC: 141 MMOL/L (ref 136–145)
SQUAMOUS #/AREA URNS HPF: ABNORMAL /HPF
T4 FREE SERPL-MCNC: 1.04 NG/DL (ref 0.93–1.7)
TRIGL SERPL-MCNC: 114 MG/DL (ref 0–150)
TSH SERPL DL<=0.05 MIU/L-ACNC: 1.94 UIU/ML (ref 0.27–4.2)
VIT B12 BLD-MCNC: 936 PG/ML (ref 211–946)
VLDLC SERPL-MCNC: 21 MG/DL (ref 5–40)
WBC # UR STRIP: ABNORMAL /HPF
WBC NRBC COR # BLD: 5.02 10*3/MM3 (ref 3.4–10.8)

## 2022-01-17 PROCEDURE — 83002 ASSAY OF GONADOTROPIN (LH): CPT

## 2022-01-17 PROCEDURE — 81015 MICROSCOPIC EXAM OF URINE: CPT

## 2022-01-17 PROCEDURE — 82306 VITAMIN D 25 HYDROXY: CPT

## 2022-01-17 PROCEDURE — 36415 COLL VENOUS BLD VENIPUNCTURE: CPT

## 2022-01-17 PROCEDURE — 84439 ASSAY OF FREE THYROXINE: CPT

## 2022-01-17 PROCEDURE — 84305 ASSAY OF SOMATOMEDIN: CPT

## 2022-01-17 PROCEDURE — 82024 ASSAY OF ACTH: CPT

## 2022-01-17 PROCEDURE — 80061 LIPID PANEL: CPT

## 2022-01-17 PROCEDURE — 80050 GENERAL HEALTH PANEL: CPT

## 2022-01-17 PROCEDURE — 83001 ASSAY OF GONADOTROPIN (FSH): CPT

## 2022-01-17 PROCEDURE — 82607 VITAMIN B-12: CPT

## 2022-01-17 PROCEDURE — 82533 TOTAL CORTISOL: CPT

## 2022-01-17 PROCEDURE — 84146 ASSAY OF PROLACTIN: CPT

## 2022-01-17 PROCEDURE — 82746 ASSAY OF FOLIC ACID SERUM: CPT

## 2022-01-17 PROCEDURE — 83003 ASSAY GROWTH HORMONE (HGH): CPT

## 2022-01-18 LAB — ACTH PLAS-MCNC: 36.4 PG/ML (ref 7.2–63.3)

## 2022-01-19 LAB
GH SERPL-MCNC: 0.3 NG/ML (ref 0–10)
IGF-I SERPL-MCNC: 97 NG/ML (ref 57–202)

## 2022-01-20 ENCOUNTER — OFFICE VISIT (OUTPATIENT)
Dept: INTERNAL MEDICINE | Facility: CLINIC | Age: 67
End: 2022-01-20

## 2022-01-20 VITALS
HEIGHT: 63 IN | DIASTOLIC BLOOD PRESSURE: 80 MMHG | TEMPERATURE: 98.4 F | BODY MASS INDEX: 27.68 KG/M2 | WEIGHT: 156.2 LBS | SYSTOLIC BLOOD PRESSURE: 110 MMHG | RESPIRATION RATE: 18 BRPM | HEART RATE: 92 BPM

## 2022-01-20 DIAGNOSIS — E78.2 MIXED HYPERLIPIDEMIA: ICD-10-CM

## 2022-01-20 DIAGNOSIS — E55.9 VITAMIN D DEFICIENCY: ICD-10-CM

## 2022-01-20 DIAGNOSIS — Z13.29 SCREENING FOR THYROID DISORDER: ICD-10-CM

## 2022-01-20 DIAGNOSIS — Z12.31 BREAST CANCER SCREENING BY MAMMOGRAM: ICD-10-CM

## 2022-01-20 DIAGNOSIS — Z13.0 SCREENING FOR DEFICIENCY ANEMIA: ICD-10-CM

## 2022-01-20 DIAGNOSIS — Z86.018 HISTORY OF PITUITARY ADENOMA: ICD-10-CM

## 2022-01-20 DIAGNOSIS — Z11.59 NEED FOR HEPATITIS C SCREENING TEST: Primary | ICD-10-CM

## 2022-01-20 DIAGNOSIS — I10 PRIMARY HYPERTENSION: ICD-10-CM

## 2022-01-20 DIAGNOSIS — J45.901 ASTHMA EXACERBATION, MILD: ICD-10-CM

## 2022-01-20 DIAGNOSIS — E53.8 VITAMIN B12 DEFICIENCY: ICD-10-CM

## 2022-01-20 PROCEDURE — 99214 OFFICE O/P EST MOD 30 MIN: CPT | Performed by: INTERNAL MEDICINE

## 2022-01-20 NOTE — PROGRESS NOTES
"Chief Complaint  Follow-up following up on asthma and acute bronchitis    Subjective  Feels much better.    Archana Delvalle presents to [facility]  History of Present Illness  Patient stopped Symbicort and Singulair when she began to feel better.  Asked to restart those and take them for whole month.  Otherwise she will relapse.  Objective   Vital Signs:   /80 (BP Location: Left arm, Patient Position: Sitting, Cuff Size: Adult)   Pulse 92   Temp 98.4 °F (36.9 °C) (Temporal)   Resp 18   Ht 160 cm (63\")   Wt 70.9 kg (156 lb 3.2 oz)   BMI 27.67 kg/m²     Physical Exam  Vitals and nursing note reviewed.   Constitutional:       Appearance: Normal appearance. She is normal weight.   Eyes:      Extraocular Movements: Extraocular movements intact.   Pulmonary:      Effort: Pulmonary effort is normal.      Breath sounds: Wheezing (Mild wheeze on forced expiration or cough) present. No rhonchi or rales.      Comments: Lungs sound much better than they did on previous visit.  Moving air well.  No rales or rhonchi.  Mild wheeze on forced expiration  Musculoskeletal:         General: Normal range of motion.   Skin:     General: Skin is warm and dry.   Neurological:      General: No focal deficit present.      Mental Status: She is alert and oriented to person, place, and time. Mental status is at baseline.   Psychiatric:         Mood and Affect: Mood normal.         Thought Content: Thought content normal.        Result Review :             No labs were done       Assessment and Plan   Diagnoses and all orders for this visit:    1. Need for hepatitis C screening test (Primary)  -     Hepatitis C antibody; Future    2. Vitamin D deficiency  -     Vitamin D 25 Hydroxy; Future    3. Vitamin B12 deficiency  -     Vitamin B12 & Folate; Future    4. Screening for thyroid disorder  -     TSH; Future    5. Breast cancer screening by mammogram  -     Mammo Screening Bilateral With CAD; " Future    6. Mixed hyperlipidemia  -     Lipid panel; Future    7. Primary hypertension  -     Comprehensive metabolic panel; Future    8. Screening for deficiency anemia  -     CBC w AUTO Differential; Future    9. Asthma exacerbation, mild  Assessment & Plan:  Patient feels better.  She received antibiotic and prednisone.  After 3 days she was feeling much better and cough with sputum was improving.  Assessment: Acute asthma exacerbation with acute bronchitis improved.  Plan: Continue the prednisone tapering.  Continue singular 10 mg for whole month.  Take the Symbicort twice daily for whole month.  Otherwise she will relapse.        10. History of pituitary adenoma  Assessment & Plan:  Patient's pituitary hormones were all checked and were within normal limits.  Assessment: Status post transsphenoidal resection of pituitary adenoma involving the sphenoid and clivus.          Follow Up Return in about 5 months (around 6/20/2022).  Patient was given instructions and counseling regarding her condition or for health maintenance advice. Please see specific information pulled into the AVS if appropriate.

## 2022-01-20 NOTE — ASSESSMENT & PLAN NOTE
Patient feels better.  She received antibiotic and prednisone.  After 3 days she was feeling much better and cough with sputum was improving.  Assessment: Acute asthma exacerbation with acute bronchitis improved.  Plan: Continue the prednisone tapering.  Continue singular 10 mg for whole month.  Take the Symbicort twice daily for whole month.  Otherwise she will relapse.

## 2022-01-20 NOTE — ASSESSMENT & PLAN NOTE
Patient's pituitary hormones were all checked and were within normal limits.  Assessment: Status post transsphenoidal resection of pituitary adenoma involving the sphenoid and clivus.

## 2022-03-27 ENCOUNTER — APPOINTMENT (OUTPATIENT)
Dept: GENERAL RADIOLOGY | Facility: HOSPITAL | Age: 67
End: 2022-03-27

## 2022-03-27 ENCOUNTER — HOSPITAL ENCOUNTER (EMERGENCY)
Facility: HOSPITAL | Age: 67
Discharge: HOME | End: 2022-03-27
Attending: EMERGENCY MEDICINE | Admitting: EMERGENCY MEDICINE

## 2022-03-27 VITALS
SYSTOLIC BLOOD PRESSURE: 107 MMHG | DIASTOLIC BLOOD PRESSURE: 56 MMHG | TEMPERATURE: 98.6 F | OXYGEN SATURATION: 98 % | RESPIRATION RATE: 17 BRPM | WEIGHT: 158.29 LBS | HEIGHT: 64 IN | HEART RATE: 82 BPM | BODY MASS INDEX: 27.02 KG/M2

## 2022-03-27 DIAGNOSIS — R42 LIGHTHEADEDNESS: ICD-10-CM

## 2022-03-27 DIAGNOSIS — R00.2 PALPITATIONS: Primary | ICD-10-CM

## 2022-03-27 LAB
ALBUMIN SERPL-MCNC: 4.8 G/DL (ref 3.5–5.2)
ALBUMIN/GLOB SERPL: 1.8 G/DL
ALP SERPL-CCNC: 60 U/L (ref 39–117)
ALT SERPL W P-5'-P-CCNC: 15 U/L (ref 1–33)
ANION GAP SERPL CALCULATED.3IONS-SCNC: 12.2 MMOL/L (ref 5–15)
AST SERPL-CCNC: 23 U/L (ref 1–32)
BASOPHILS # BLD AUTO: 0.06 10*3/MM3 (ref 0–0.2)
BASOPHILS NFR BLD AUTO: 0.7 % (ref 0–1.5)
BILIRUB SERPL-MCNC: 0.2 MG/DL (ref 0–1.2)
BUN SERPL-MCNC: 14 MG/DL (ref 8–23)
BUN/CREAT SERPL: 17.5 (ref 7–25)
CALCIUM SPEC-SCNC: 9.4 MG/DL (ref 8.6–10.5)
CHLORIDE SERPL-SCNC: 102 MMOL/L (ref 98–107)
CO2 SERPL-SCNC: 26.8 MMOL/L (ref 22–29)
CREAT SERPL-MCNC: 0.8 MG/DL (ref 0.57–1)
DEPRECATED RDW RBC AUTO: 44.9 FL (ref 37–54)
EGFRCR SERPLBLD CKD-EPI 2021: 82.4 ML/MIN/1.73
EOSINOPHIL # BLD AUTO: 0.05 10*3/MM3 (ref 0–0.4)
EOSINOPHIL NFR BLD AUTO: 0.6 % (ref 0.3–6.2)
ERYTHROCYTE [DISTWIDTH] IN BLOOD BY AUTOMATED COUNT: 12.9 % (ref 12.3–15.4)
GLOBULIN UR ELPH-MCNC: 2.7 GM/DL
GLUCOSE SERPL-MCNC: 86 MG/DL (ref 65–99)
HCT VFR BLD AUTO: 40.1 % (ref 34–46.6)
HGB BLD-MCNC: 13.3 G/DL (ref 12–15.9)
HOLD SPECIMEN: NORMAL
HOLD SPECIMEN: NORMAL
IMM GRANULOCYTES # BLD AUTO: 0.02 10*3/MM3 (ref 0–0.05)
IMM GRANULOCYTES NFR BLD AUTO: 0.2 % (ref 0–0.5)
LYMPHOCYTES # BLD AUTO: 2.28 10*3/MM3 (ref 0.7–3.1)
LYMPHOCYTES NFR BLD AUTO: 28.4 % (ref 19.6–45.3)
MAGNESIUM SERPL-MCNC: 2.3 MG/DL (ref 1.6–2.4)
MCH RBC QN AUTO: 31.5 PG (ref 26.6–33)
MCHC RBC AUTO-ENTMCNC: 33.2 G/DL (ref 31.5–35.7)
MCV RBC AUTO: 95 FL (ref 79–97)
MONOCYTES # BLD AUTO: 0.69 10*3/MM3 (ref 0.1–0.9)
MONOCYTES NFR BLD AUTO: 8.6 % (ref 5–12)
NEUTROPHILS NFR BLD AUTO: 4.94 10*3/MM3 (ref 1.7–7)
NEUTROPHILS NFR BLD AUTO: 61.5 % (ref 42.7–76)
NRBC BLD AUTO-RTO: 0 /100 WBC (ref 0–0.2)
PLATELET # BLD AUTO: 230 10*3/MM3 (ref 140–450)
PMV BLD AUTO: 12.2 FL (ref 6–12)
POTASSIUM SERPL-SCNC: 3.7 MMOL/L (ref 3.5–5.2)
PROT SERPL-MCNC: 7.5 G/DL (ref 6–8.5)
RBC # BLD AUTO: 4.22 10*6/MM3 (ref 3.77–5.28)
SODIUM SERPL-SCNC: 141 MMOL/L (ref 136–145)
TROPONIN T SERPL-MCNC: <0.01 NG/ML (ref 0–0.03)
WBC NRBC COR # BLD: 8.04 10*3/MM3 (ref 3.4–10.8)
WHOLE BLOOD HOLD SPECIMEN: NORMAL
WHOLE BLOOD HOLD SPECIMEN: NORMAL

## 2022-03-27 PROCEDURE — 36415 COLL VENOUS BLD VENIPUNCTURE: CPT

## 2022-03-27 PROCEDURE — 80053 COMPREHEN METABOLIC PANEL: CPT | Performed by: EMERGENCY MEDICINE

## 2022-03-27 PROCEDURE — 99283 EMERGENCY DEPT VISIT LOW MDM: CPT

## 2022-03-27 PROCEDURE — 71045 X-RAY EXAM CHEST 1 VIEW: CPT

## 2022-03-27 PROCEDURE — 84484 ASSAY OF TROPONIN QUANT: CPT | Performed by: EMERGENCY MEDICINE

## 2022-03-27 PROCEDURE — 93005 ELECTROCARDIOGRAM TRACING: CPT

## 2022-03-27 PROCEDURE — 85025 COMPLETE CBC W/AUTO DIFF WBC: CPT | Performed by: EMERGENCY MEDICINE

## 2022-03-27 PROCEDURE — 93005 ELECTROCARDIOGRAM TRACING: CPT | Performed by: EMERGENCY MEDICINE

## 2022-03-27 PROCEDURE — 83735 ASSAY OF MAGNESIUM: CPT | Performed by: EMERGENCY MEDICINE

## 2022-03-27 PROCEDURE — 93010 ELECTROCARDIOGRAM REPORT: CPT | Performed by: INTERNAL MEDICINE

## 2022-03-27 RX ORDER — SODIUM CHLORIDE 0.9 % (FLUSH) 0.9 %
10 SYRINGE (ML) INJECTION AS NEEDED
Status: DISCONTINUED | OUTPATIENT
Start: 2022-03-27 | End: 2022-03-27 | Stop reason: HOSPADM

## 2022-03-27 NOTE — DISCHARGE INSTRUCTIONS
Restart taking 1/2 tablet of your Toprol XL 25 mg tablets in the morning as directed by Dr. Joe.

## 2022-03-27 NOTE — ED PROVIDER NOTES
Time: 4:19 PM EDT  Arrived by: SOFÍA  Chief Complaint: Lightheadedness  History provided by: Patient  History is limited by: N/A     History of Present Illness:  Patient is a 66 y.o. year old female that presents to the emergency department with lightheadedness that has been present for the past couple of months but has worsened over the last few days. She states at first the lightheadedness episodes were at first intermittent, but have become more frequent and worse over the past few days. She describes the lightheadedness as feeling like she might pass out. Her symptoms are moderate with no modifying factors.     She notes that she has palpitations with her episodes of lightheadedness. She states that sometimes the palpitations happen before the lightheadedness, but not all the time.     She follows up with Dr. Joe, Cardiology. She reports that she had both a Holter-Monitor and Stress test done last year and they were both negative.      History provided by:  Patient   used: No        Similar Symptoms Previously: N/a  Recently seen: N/a      Patient Care Team  Primary Care Provider: Helena Adorno MD    Past Medical History:     Allergies   Allergen Reactions   • Codeine Provider Review Needed   • Tetracycline Provider Review Needed   • Ceftin [Cefuroxime Axetil] Nausea Only   • Levaquin [Levofloxacin] Diarrhea   • Singulair [Montelukast Sodium] Nausea Only   • Zocor [Simvastatin] Myalgia     Past Medical History:   Diagnosis Date   • Asthma    • High cholesterol    • Tachycardia      Past Surgical History:   Procedure Laterality Date   • BREAST BIOPSY     •  SECTION     • COLONOSCOPY      2009   • ENDOMETRIAL ABLATION     • ENDOSCOPY     • OTHER SURGICAL HISTORY      Incision and Draining of Abcess     Family History   Problem Relation Age of Onset   • Diabetes Father         Unspecified type   • Prostate cancer Father        Home Medications:  Prior to Admission  "medications    Medication Sig Start Date End Date Taking? Authorizing Provider   albuterol sulfate  (90 Base) MCG/ACT inhaler INHALE 2 PUFFS EVERY 4 HOURS AS NEEDED 12/13/21   Helean Adorno MD   Auvi-Q 0.3 MG/0.3ML solution auto-injector injection INJECT AS NEEDED FOR SEVERE ALLERGIC REACTION INCLUDING ANAPHYLAXIS AS DIRECTED 12/14/21   Emergency, Nurse Gato, RN   metoprolol succinate XL (TOPROL-XL) 25 MG 24 hr tablet Take 1 tablet by mouth Daily. 10/26/21   Brennen Joe MD   multivitamin with minerals (CENTROVITE) tablet tablet Take  by mouth.    Provider, MD Nael   rosuvastatin (CRESTOR) 10 MG tablet TAKE 1 TABLET BY MOUTH EVERY DAY 10/28/21   Helena Adorno MD        Social History:   Social History     Tobacco Use   • Smoking status: Never Smoker   • Smokeless tobacco: Never Used   Vaping Use   • Vaping Use: Never used   Substance Use Topics   • Alcohol use: Never     Comment: Current some day, drinks rarely; wine   • Drug use: Never       Review of Systems:  Review of Systems   Constitutional: Negative for chills and fever.   HENT: Negative for congestion, nosebleeds and rhinorrhea.    Eyes: Negative for visual disturbance.   Respiratory: Negative for cough and shortness of breath.    Cardiovascular: Positive for palpitations. Negative for chest pain and leg swelling.   Gastrointestinal: Negative for abdominal pain, diarrhea, nausea and vomiting.   Genitourinary: Negative for dysuria and hematuria.   Musculoskeletal: Negative for back pain.   Skin: Negative for pallor and rash.   Neurological: Positive for light-headedness. Negative for dizziness, weakness, numbness and headaches.        Physical Exam:  /56   Pulse 82   Temp 98.6 °F (37 °C)   Resp 17   Ht 161.3 cm (63.5\")   Wt 71.8 kg (158 lb 4.6 oz)   SpO2 98%   BMI 27.60 kg/m²     Physical Exam  Vitals and nursing note reviewed.   Constitutional:       General: She is not in acute distress.     Appearance: Normal " appearance.   HENT:      Head: Normocephalic and atraumatic.      Mouth/Throat:      Mouth: Mucous membranes are moist.   Eyes:      General: No scleral icterus.     Extraocular Movements: Extraocular movements intact.      Pupils: Pupils are equal, round, and reactive to light.   Cardiovascular:      Rate and Rhythm: Normal rate and regular rhythm.      Pulses: Normal pulses.      Heart sounds: Normal heart sounds. No murmur heard.  Pulmonary:      Effort: Pulmonary effort is normal. No respiratory distress.      Breath sounds: Normal breath sounds. No wheezing.   Abdominal:      General: There is no distension.      Palpations: Abdomen is soft.      Tenderness: There is no abdominal tenderness.   Musculoskeletal:         General: Normal range of motion.      Cervical back: Neck supple.      Right lower leg: No edema.      Left lower leg: No edema.   Skin:     Coloration: Skin is not pale.   Neurological:      General: No focal deficit present.      Mental Status: She is alert and oriented to person, place, and time.      Cranial Nerves: No cranial nerve deficit.      Sensory: No sensory deficit.                Medications in the Emergency Department:  Medications - No data to display     Labs  Lab Results (last 24 hours)     ** No results found for the last 24 hours. **           Imaging:  No Radiology Exams Resulted Within Past 24 Hours    Procedures:  Procedures    Progress  ED Course as of 03/28/22 2344   Sun Mar 27, 2022   1541 EKG:    Rhythm: nsr  Rate: 89  Intervals: normal   T-wave: normal  ST Segment: nonspecific    EKG Comparison: 3/25/21 similar    Interpreted by me   [NL]      ED Course User Index  [NL] Jc Caldera DO                            Medical Decision Making:  MDM   The patient's work-up was negative for any acute findings and her symptoms were resolved while in the emergency department.  Patient is stable for discharge.    Final diagnoses:   Palpitations   Lightheadedness         Disposition:  ED Disposition     ED Disposition   Discharge    Condition   Stable    Comment   --             Documentation assistance provided by Sahil Ellis acting as scribe for Jc Caldera DO. Information recorded by the scribe was done at my direction and has been verified and validated by me.        Sahil Ellis  03/27/22 1630       Sahil Ellis  03/27/22 1709       Jc Caldera DO  03/28/22 1309

## 2022-03-29 ENCOUNTER — OFFICE VISIT (OUTPATIENT)
Dept: CARDIOLOGY | Facility: CLINIC | Age: 67
End: 2022-03-29

## 2022-03-29 VITALS
DIASTOLIC BLOOD PRESSURE: 44 MMHG | WEIGHT: 158.8 LBS | OXYGEN SATURATION: 100 % | HEIGHT: 64 IN | BODY MASS INDEX: 27.11 KG/M2 | HEART RATE: 82 BPM | SYSTOLIC BLOOD PRESSURE: 114 MMHG

## 2022-03-29 DIAGNOSIS — R00.2 PALPITATIONS: Primary | ICD-10-CM

## 2022-03-29 DIAGNOSIS — R42 LIGHTHEADEDNESS: ICD-10-CM

## 2022-03-29 PROBLEM — E78.00 HIGH CHOLESTEROL: Status: RESOLVED | Noted: 2021-07-20 | Resolved: 2022-03-29

## 2022-03-29 PROCEDURE — 99214 OFFICE O/P EST MOD 30 MIN: CPT | Performed by: NURSE PRACTITIONER

## 2022-03-29 RX ORDER — MULTIPLE VITAMINS W/ MINERALS TAB 9MG-400MCG
1 TAB ORAL DAILY
COMMUNITY

## 2022-03-29 RX ORDER — MULTIPLE VITAMINS W/ MINERALS TAB 9MG-400MCG
2 TAB ORAL DAILY
COMMUNITY
End: 2022-06-07 | Stop reason: ALTCHOICE

## 2022-03-29 NOTE — PROGRESS NOTES
"Chief Complaint  Palpitations and Hyperlipidemia    Subjective            History of Present Illness  Archana Delvalle is a 66-year-old white/ female patient who presents to the office today for follow-up.  She has palpitations which she is prescribed metoprolol 25 mg daily.  She reports some lightheadedness without dizziness that occurs with prolonged standing.  She states this is not positional in nature, does not occur going from sitting to standing position.  She reports that she has some shortness of breath with physical exertion but not associated with the lightheadedness.  She has also noticed that her blood pressure has been running \"lower than it normally does\" which is why she stopped taking her metoprolol toward the end of December.    PMH  Past Medical History:   Diagnosis Date   • Asthma    • High cholesterol    • Tachycardia          ALLERGY  Allergies   Allergen Reactions   • Codeine Provider Review Needed   • Tetracycline Provider Review Needed   • Levaquin [Levofloxacin] Diarrhea   • Singulair [Montelukast Sodium] Nausea Only          SURGICALHX  Past Surgical History:   Procedure Laterality Date   • BREAST BIOPSY     •  SECTION     • COLONOSCOPY      2009   • ENDOMETRIAL ABLATION     • ENDOSCOPY     • OTHER SURGICAL HISTORY      Incision and Draining of Abcess          SOC  Social History     Socioeconomic History   • Marital status: Single   Tobacco Use   • Smoking status: Never Smoker   • Smokeless tobacco: Never Used   Vaping Use   • Vaping Use: Never used   Substance and Sexual Activity   • Alcohol use: Never     Comment: Current some day, drinks rarely; wine   • Drug use: Never   • Sexual activity: Defer         FAMHX  Family History   Problem Relation Age of Onset   • Diabetes Father         Unspecified type   • Prostate cancer Father           MEDSIGONLY  Current Outpatient Medications on File Prior to Visit   Medication Sig   • albuterol sulfate  (90 Base) " "MCG/ACT inhaler INHALE 2 PUFFS EVERY 4 HOURS AS NEEDED   • Auvi-Q 0.3 MG/0.3ML solution auto-injector injection INJECT AS NEEDED FOR SEVERE ALLERGIC REACTION INCLUDING ANAPHYLAXIS AS DIRECTED   • metoprolol succinate XL (TOPROL-XL) 25 MG 24 hr tablet Take 1 tablet by mouth Daily.   • multivitamin with minerals (HAIR/SKIN/NAILS/BIOTIN PO) Take 1 tablet by mouth Daily.   • multivitamin with minerals (WOMENS MULTIVITAMIN PO) Take 2 tablets by mouth Daily.   • rosuvastatin (CRESTOR) 10 MG tablet TAKE 1 TABLET BY MOUTH EVERY DAY   • [DISCONTINUED] multivitamin with minerals tablet tablet Take  by mouth.     No current facility-administered medications on file prior to visit.         Objective   /44 (BP Location: Left arm, Patient Position: Sitting, Cuff Size: Adult)   Pulse 82   Ht 161.3 cm (63.5\")   Wt 72 kg (158 lb 12.8 oz)   SpO2 100%   BMI 27.69 kg/m²     Blood pressure checked while standin/60  Physical Exam  HENT:      Head: Normocephalic.   Cardiovascular:      Rate and Rhythm: Normal rate and regular rhythm.      Pulses: Normal pulses.      Heart sounds: Normal heart sounds. No murmur heard.  Pulmonary:      Effort: Pulmonary effort is normal.      Breath sounds: Normal breath sounds.   Musculoskeletal:      Cervical back: Neck supple.      Right lower leg: No edema.      Left lower leg: No edema.   Skin:     General: Skin is dry.      Capillary Refill: Capillary refill takes less than 2 seconds.   Neurological:      Mental Status: She is alert and oriented to person, place, and time.   Psychiatric:         Behavior: Behavior normal.       Result Review :   The following data was reviewed by: MICHAEL Burgos on 2022:  No results found for: PROBNP  CMP    CMP 3/27/22   Glucose 86   BUN 14   Creatinine 0.80   eGFR Non African Am    Sodium 141   Potassium 3.7   Chloride 102   Calcium 9.4   Albumin 4.80   Total Bilirubin 0.2   Alkaline Phosphatase 60   AST (SGOT) 23   ALT (SGPT) 15   (A) " Abnormal value            CBC w/diff    CBC w/Diff 3/27/22   WBC 8.04   RBC 4.22   Hemoglobin 13.3   Hematocrit 40.1   MCV 95.0   MCH 31.5   MCHC 33.2   RDW 12.9   Platelets 230   Neutrophil Rel % 61.5   Immature Granulocyte Rel % 0.2   Lymphocyte Rel % 28.4   Monocyte Rel % 8.6   Eosinophil Rel % 0.6   Basophil Rel % 0.7   (A) Abnormal value             Lab Results   Component Value Date    TSH 1.940 01/17/2022      Lab Results   Component Value Date    FREET4 1.04 01/17/2022      No results found for: DDIMERQUANT  Magnesium   Date Value Ref Range Status   03/27/2022 2.3 1.6 - 2.4 mg/dL Final      No results found for: DIGOXIN   Lab Results   Component Value Date    TROPONINT <0.010 03/27/2022           Lipid Panel    Lipid Panel 1/17/22   Total Cholesterol 169   Triglycerides 114   HDL Cholesterol 50   VLDL Cholesterol 21   LDL Cholesterol  98   LDL/HDL Ratio 1.92              Assessment and Plan    Diagnoses and all orders for this visit:    1. Palpitations (Primary)  She stopped taking her metoprolol toward the end of December due to low blood pressure readings but this caused her heart rate to increase and palpitations have increased in frequency.  Advised to start back on the metoprolol and to monitor her blood pressure. Check blood pressure twice a day for the next two weeks, blood pressure log provided for patient.  Will review log once available to me and will make any necessary medication adjustments at that time.  Obtain 7-day Holter monitor to assess for tachycardia or bradycardia arrhythmia  -     Holter Monitor - 72 Hour Up To 15 Days; Future    2. Lightheadedness  Does not appear to be orthostatic in nature since her sitting and standing blood pressures were quite similar in the office today.  Obtain tilt table test to assess for cardiogenic syncope.  Since that she has been having some new shortness of breath with physical exertion, obtain echocardiogram to assess valve function and left ventricular  systolic function  -     Tilt Table; Future  -     Adult Transthoracic Echo Complete W/ Cont if Necessary Per Protocol; Future      Follow Up   Return for Follow up with Dr Joe in June as scheduled.    Patient was given instructions and counseling regarding her condition or for health maintenance advice. Please see specific information pulled into the AVS if appropriate.     Archana PURVIS Delvalle  reports that she has never smoked. She has never used smokeless tobacco.           Aura Rhoades, MICHAEL  03/29/22  11:55 EDT    Dictated Utilizing Dragon Dictation

## 2022-03-30 LAB — QT INTERVAL: 362 MS

## 2022-04-27 ENCOUNTER — HOSPITAL ENCOUNTER (OUTPATIENT)
Dept: CARDIOLOGY | Facility: HOSPITAL | Age: 67
Discharge: HOME | End: 2022-04-27
Admitting: NURSE PRACTITIONER

## 2022-04-27 DIAGNOSIS — R42 LIGHTHEADEDNESS: ICD-10-CM

## 2022-04-27 PROCEDURE — 93306 TTE W/DOPPLER COMPLETE: CPT

## 2022-04-27 PROCEDURE — 93306 TTE W/DOPPLER COMPLETE: CPT | Performed by: SPECIALIST

## 2022-04-28 LAB
BH CV ECHO MEAS - AO ROOT DIAM: 2.5 CM
BH CV ECHO MEAS - EF(MOD-BP): 65 %
BH CV ECHO MEAS - IVSD: 0.9 CM
BH CV ECHO MEAS - LA DIMENSION(2D): 3.1 CM
BH CV ECHO MEAS - LAT PEAK E' VEL: 5 CM/SEC
BH CV ECHO MEAS - LVIDD: 3.9 CM
BH CV ECHO MEAS - LVIDS: 2.3 CM
BH CV ECHO MEAS - LVPWD: 0.8 CM
BH CV ECHO MEAS - MED PEAK E' VEL: 8 CM/SEC
BH CV ECHO MEAS - MV A MAX VEL: 83 CM/SEC
BH CV ECHO MEAS - MV DEC TIME: 120 MSEC
BH CV ECHO MEAS - MV E MAX VEL: 49 CM/SEC
BH CV ECHO MEAS - MV E/A: 0.6
BH CV ECHO MEASUREMENTS AVERAGE E/E' RATIO: 7.54
IVRT: 62 MSEC
LEFT ATRIUM VOLUME INDEX: 13 ML/M2
MAXIMAL PREDICTED HEART RATE: 156 BPM
STRESS TARGET HR: 133 BPM

## 2022-04-29 ENCOUNTER — TELEPHONE (OUTPATIENT)
Dept: CARDIOLOGY | Facility: CLINIC | Age: 67
End: 2022-04-29

## 2022-04-29 NOTE — TELEPHONE ENCOUNTER
----- Message from MICHAEL Beebe sent at 4/28/2022  1:14 PM EDT -----  Notify pt echo is normal: EF 65% and no valve disease noted, keep June follow up as scheduled

## 2022-05-17 ENCOUNTER — LAB (OUTPATIENT)
Dept: LAB | Facility: HOSPITAL | Age: 67
End: 2022-05-17

## 2022-05-17 DIAGNOSIS — E78.2 MIXED HYPERLIPIDEMIA: ICD-10-CM

## 2022-05-17 DIAGNOSIS — E55.9 VITAMIN D DEFICIENCY: ICD-10-CM

## 2022-05-17 DIAGNOSIS — I10 PRIMARY HYPERTENSION: ICD-10-CM

## 2022-05-17 DIAGNOSIS — Z13.0 SCREENING FOR DEFICIENCY ANEMIA: ICD-10-CM

## 2022-05-17 DIAGNOSIS — E53.8 VITAMIN B12 DEFICIENCY: ICD-10-CM

## 2022-05-17 DIAGNOSIS — Z11.59 NEED FOR HEPATITIS C SCREENING TEST: ICD-10-CM

## 2022-05-17 DIAGNOSIS — Z13.29 SCREENING FOR THYROID DISORDER: ICD-10-CM

## 2022-05-17 LAB
25(OH)D3 SERPL-MCNC: 34.7 NG/ML (ref 30–100)
ALBUMIN SERPL-MCNC: 4.5 G/DL (ref 3.5–5.2)
ALBUMIN/GLOB SERPL: 2 G/DL
ALP SERPL-CCNC: 51 U/L (ref 39–117)
ALT SERPL W P-5'-P-CCNC: 15 U/L (ref 1–33)
ANION GAP SERPL CALCULATED.3IONS-SCNC: 13.1 MMOL/L (ref 5–15)
AST SERPL-CCNC: 23 U/L (ref 1–32)
BASOPHILS # BLD AUTO: 0.05 10*3/MM3 (ref 0–0.2)
BASOPHILS NFR BLD AUTO: 1.1 % (ref 0–1.5)
BILIRUB SERPL-MCNC: 0.4 MG/DL (ref 0–1.2)
BUN SERPL-MCNC: 11 MG/DL (ref 8–23)
BUN/CREAT SERPL: 14.9 (ref 7–25)
CALCIUM SPEC-SCNC: 9 MG/DL (ref 8.6–10.5)
CHLORIDE SERPL-SCNC: 105 MMOL/L (ref 98–107)
CHOLEST SERPL-MCNC: 134 MG/DL (ref 0–200)
CO2 SERPL-SCNC: 24.9 MMOL/L (ref 22–29)
CREAT SERPL-MCNC: 0.74 MG/DL (ref 0.57–1)
DEPRECATED RDW RBC AUTO: 44 FL (ref 37–54)
EGFRCR SERPLBLD CKD-EPI 2021: 90.5 ML/MIN/1.73
EOSINOPHIL # BLD AUTO: 0.09 10*3/MM3 (ref 0–0.4)
EOSINOPHIL NFR BLD AUTO: 2 % (ref 0.3–6.2)
ERYTHROCYTE [DISTWIDTH] IN BLOOD BY AUTOMATED COUNT: 12.3 % (ref 12.3–15.4)
FOLATE SERPL-MCNC: >20 NG/ML (ref 4.78–24.2)
GLOBULIN UR ELPH-MCNC: 2.3 GM/DL
GLUCOSE SERPL-MCNC: 87 MG/DL (ref 65–99)
HCT VFR BLD AUTO: 38.1 % (ref 34–46.6)
HCV AB SER DONR QL: NORMAL
HDLC SERPL-MCNC: 44 MG/DL (ref 40–60)
HGB BLD-MCNC: 12.8 G/DL (ref 12–15.9)
IMM GRANULOCYTES # BLD AUTO: 0.01 10*3/MM3 (ref 0–0.05)
IMM GRANULOCYTES NFR BLD AUTO: 0.2 % (ref 0–0.5)
LDLC SERPL CALC-MCNC: 64 MG/DL (ref 0–100)
LDLC/HDLC SERPL: 1.36 {RATIO}
LYMPHOCYTES # BLD AUTO: 1.77 10*3/MM3 (ref 0.7–3.1)
LYMPHOCYTES NFR BLD AUTO: 38.6 % (ref 19.6–45.3)
MCH RBC QN AUTO: 32.6 PG (ref 26.6–33)
MCHC RBC AUTO-ENTMCNC: 33.6 G/DL (ref 31.5–35.7)
MCV RBC AUTO: 96.9 FL (ref 79–97)
MONOCYTES # BLD AUTO: 0.63 10*3/MM3 (ref 0.1–0.9)
MONOCYTES NFR BLD AUTO: 13.7 % (ref 5–12)
NEUTROPHILS NFR BLD AUTO: 2.04 10*3/MM3 (ref 1.7–7)
NEUTROPHILS NFR BLD AUTO: 44.4 % (ref 42.7–76)
NRBC BLD AUTO-RTO: 0 /100 WBC (ref 0–0.2)
PLATELET # BLD AUTO: 186 10*3/MM3 (ref 140–450)
PMV BLD AUTO: 13 FL (ref 6–12)
POTASSIUM SERPL-SCNC: 4.1 MMOL/L (ref 3.5–5.2)
PROT SERPL-MCNC: 6.8 G/DL (ref 6–8.5)
RBC # BLD AUTO: 3.93 10*6/MM3 (ref 3.77–5.28)
SODIUM SERPL-SCNC: 143 MMOL/L (ref 136–145)
TRIGL SERPL-MCNC: 150 MG/DL (ref 0–150)
TSH SERPL DL<=0.05 MIU/L-ACNC: 2.28 UIU/ML (ref 0.27–4.2)
VIT B12 BLD-MCNC: 863 PG/ML (ref 211–946)
VLDLC SERPL-MCNC: 26 MG/DL (ref 5–40)
WBC NRBC COR # BLD: 4.59 10*3/MM3 (ref 3.4–10.8)

## 2022-05-17 PROCEDURE — 82306 VITAMIN D 25 HYDROXY: CPT

## 2022-05-17 PROCEDURE — 82607 VITAMIN B-12: CPT

## 2022-05-17 PROCEDURE — 82746 ASSAY OF FOLIC ACID SERUM: CPT

## 2022-05-17 PROCEDURE — 80050 GENERAL HEALTH PANEL: CPT

## 2022-05-17 PROCEDURE — 80061 LIPID PANEL: CPT

## 2022-05-17 PROCEDURE — 86803 HEPATITIS C AB TEST: CPT

## 2022-05-17 PROCEDURE — 36415 COLL VENOUS BLD VENIPUNCTURE: CPT

## 2022-05-26 ENCOUNTER — OFFICE VISIT (OUTPATIENT)
Dept: INTERNAL MEDICINE | Facility: CLINIC | Age: 67
End: 2022-05-26

## 2022-05-26 DIAGNOSIS — R63.5 WEIGHT GAIN: ICD-10-CM

## 2022-05-26 DIAGNOSIS — R00.2 PALPITATIONS: ICD-10-CM

## 2022-05-26 DIAGNOSIS — E78.2 HYPERLIPEMIA, MIXED: ICD-10-CM

## 2022-05-26 DIAGNOSIS — R00.0 TACHYCARDIA: ICD-10-CM

## 2022-05-26 DIAGNOSIS — R53.83 FATIGUE, UNSPECIFIED TYPE: ICD-10-CM

## 2022-05-26 DIAGNOSIS — E55.9 VITAMIN D DEFICIENCY: ICD-10-CM

## 2022-05-26 DIAGNOSIS — Z86.018 HISTORY OF PITUITARY ADENOMA: ICD-10-CM

## 2022-05-26 DIAGNOSIS — E78.5 HYPERLIPIDEMIA, UNSPECIFIED HYPERLIPIDEMIA TYPE: Primary | ICD-10-CM

## 2022-05-26 DIAGNOSIS — E87.1 HYPONATREMIA: ICD-10-CM

## 2022-05-26 DIAGNOSIS — N28.9 ABNORMAL KIDNEY FUNCTION: ICD-10-CM

## 2022-05-26 PROCEDURE — 99214 OFFICE O/P EST MOD 30 MIN: CPT | Performed by: INTERNAL MEDICINE

## 2022-05-26 RX ORDER — CETIRIZINE HYDROCHLORIDE 5 MG/1
5 TABLET, CHEWABLE ORAL DAILY
COMMUNITY

## 2022-05-26 RX ORDER — MELATONIN
1000 DAILY
COMMUNITY

## 2022-05-26 RX ORDER — ESCITALOPRAM OXALATE 10 MG/1
10 TABLET ORAL DAILY
COMMUNITY
End: 2022-09-08

## 2022-05-26 NOTE — ASSESSMENT & PLAN NOTE
She had pituitary adenoma resected by Dr. Leahy in [place].  It was picked up incidentally on an MRI checking for sinusitis by ENT.  Pituitary test were all normal earlier this year.  Assessment: Status post pituitary adenoma resection.  Clinically stable.  Follows with neurosurgeon later this fall.  Plan: Patient can have pituitary endocrine hormones checked once a year

## 2022-05-26 NOTE — ASSESSMENT & PLAN NOTE
Patient had palpitations and saw her cardiologist after she initially was seen in the emergency room.  Echocardiogram was essentially unremarkable and Holter monitor was essentially unremarkable.  Cardiologist put her on Toprol-XL 12.5 mg.  She is having no more palpitations.  Tolerating the 12.5 without fatigue.  She had previously been on 25 mg and it made her very fatigued and her blood pressure too low.

## 2022-05-26 NOTE — ASSESSMENT & PLAN NOTE
Lipid panel is excellent.  Assessment: Hyperlipidemia on rosuvastatin.  Plan: Rosuvastatin 10 mg at bedtime

## 2022-05-26 NOTE — PROGRESS NOTES
Chief Complaint  Labs Only (Patient here for a 6 month follow up with labs.) and Medication Problem (Pt was given escitalopram 10 and was wanting to know if she could take this with her metoprolol succer hat was added by Dr. Joe.)    Subjective  Patient has been feeling well.  She wants to know she can take 5 mg of Lexapro and she can take it with metoprolol XL.    Archana Delvalle presents to [facility]  History of Present Illness  66-year-old lady who is just signed up for Medicare insurance.  She has made some good choices.  No chest pain or shortness of breath now.  No more recent palpitations.  She had bilateral cataract extractions done the most recent one was done since her last visit by Dr. Gomez.  The other would on the right with stents in 2019.  She seemed very well.  COVID illness in November 2020 before vaccines.  Up-to-date on COVID vaccines.  History of MVP and occasional palpitations and has seen cardiology periodically for that.  Negative colonoscopy in 2018.  History of mild asthma and allergies.  Had pulmonary functions in 2020 that showed no evidence of rash obstruction.  No response to bronchodilator and no restriction.  Diffusing capacity was normal.  FEV1/FVC was 82%.  FEV1 was 110% predicted.  She is to have a repeat colonoscopy in 2023 for follow-up on 1 TA polyp in March 2018.  Perennial allergies to dust and molds.  Previous allergy evaluation.  She has been on Flonase and Zyrtec in the past.  Has ProAir to use as needed.  Objective   Vital Signs:   There were no vitals taken for this visit.    Physical Exam  Vitals and nursing note reviewed.   Constitutional:       Appearance: Normal appearance.   HENT:      Head: Normocephalic.   Eyes:      Extraocular Movements: Extraocular movements intact.   Cardiovascular:      Rate and Rhythm: Normal rate and regular rhythm.      Heart sounds: Normal heart sounds. No murmur heard.  Pulmonary:      Effort:  Pulmonary effort is normal.      Breath sounds: Normal breath sounds. No wheezing or rales.   Abdominal:      General: Bowel sounds are normal.      Palpations: Abdomen is soft.      Tenderness: There is no abdominal tenderness. There is no guarding.   Musculoskeletal:         General: No swelling. Normal range of motion.   Skin:     General: Skin is warm and dry.   Neurological:      General: No focal deficit present.      Mental Status: She is alert and oriented to person, place, and time. Mental status is at baseline.   Psychiatric:         Mood and Affect: Mood normal.         Behavior: Behavior normal.         Thought Content: Thought content normal.         Judgment: Judgment normal.        Result Review :  The following data was reviewed by: Helena Adorno MD on 05/26/2022:  CMP    CMP 1/17/22 3/27/22 5/17/22   Glucose 93 86 87   BUN 9 14 11   Creatinine 0.68 0.80 0.74   eGFR Non African Am 87     Sodium 141 141 143   Potassium 3.8 3.7 4.1   Chloride 102 102 105   Calcium 9.2 9.4 9.0   Albumin 4.40 4.80 4.50   Total Bilirubin 0.4 0.2 0.4   Alkaline Phosphatase 61 60 51   AST (SGOT) 25 23 23   ALT (SGPT) 18 15 15           CBC w/diff    CBC w/Diff 1/17/22 3/27/22 5/17/22   WBC 5.02 8.04 4.59   RBC 4.03 4.22 3.93   Hemoglobin 12.7 13.3 12.8   Hematocrit 38.3 40.1 38.1   MCV 95.0 95.0 96.9   MCH 31.5 31.5 32.6   MCHC 33.2 33.2 33.6   RDW 13.0 12.9 12.3   Platelets 244 230 186   Neutrophil Rel % 54.3 61.5 44.4   Immature Granulocyte Rel % 0.2 0.2 0.2   Lymphocyte Rel % 32.7 28.4 38.6   Monocyte Rel % 10.0 8.6 13.7 (A)   Eosinophil Rel % 2.0 0.6 2.0   Basophil Rel % 0.8 0.7 1.1   (A) Abnormal value            Lipid Panel    Lipid Panel 1/17/22 5/17/22   Total Cholesterol 169 134   Triglycerides 114 150   HDL Cholesterol 50 44   VLDL Cholesterol 21 26   LDL Cholesterol  98 64   LDL/HDL Ratio 1.92 1.36           TSH    TSH 1/17/22 5/17/22   TSH 1.940 2.280           UA    Urinalysis 9/9/21 9/9/21 9/15/21 9/15/21  1/17/22    0546 1202 1705 1705    Squamous Epithelial Cells, UA    0-2 0-2   Specific Gravity, UA 1.007 1.007 1.009     Ketones, UA   Negative     Blood, UA   Negative     Leukocytes, UA   Small (1+) (A)     Nitrite, UA   Negative     RBC, UA    0-2 None Seen   WBC, UA    0-2 6-12 (A)   Bacteria, UA    None Seen None Seen   (A) Abnormal value                          Assessment and Plan   Diagnoses and all orders for this visit:    1. Hyperlipidemia, unspecified hyperlipidemia type (Primary)    2. Palpitations  Assessment & Plan:  Patient had palpitations and saw her cardiologist after she initially was seen in the emergency room.  Echocardiogram was essentially unremarkable and Holter monitor was essentially unremarkable.  Cardiologist put her on Toprol-XL 12.5 mg.  She is having no more palpitations.  Tolerating the 12.5 without fatigue.  She had previously been on 25 mg and it made her very fatigued and her blood pressure too low.      3. Tachycardia    4. Hyponatremia    5. Abnormal kidney function    6. Vitamin D deficiency    7. Fatigue, unspecified type    8. Weight gain    9. Hyperlipemia, mixed  Assessment & Plan:  Lipid panel is excellent.  Assessment: Hyperlipidemia on rosuvastatin.  Plan: Rosuvastatin 10 mg at bedtime      10. History of pituitary adenoma  Assessment & Plan:  She had pituitary adenoma resected by Dr. Leahy in [place].  It was picked up incidentally on an MRI checking for sinusitis by ENT.  Pituitary test were all normal earlier this year.  Assessment: Status post pituitary adenoma resection.  Clinically stable.  Follows with neurosurgeon later this fall.  Plan: Patient can have pituitary endocrine hormones checked once a year          Follow Up Return in about 6 months (around 11/26/2022).  Patient was given instructions and counseling regarding her condition or for health maintenance advice. Please see specific information pulled into the AVS if appropriate.

## 2022-05-31 ENCOUNTER — HOSPITAL ENCOUNTER (OUTPATIENT)
Dept: CARDIOLOGY | Facility: HOSPITAL | Age: 67
Discharge: HOME | End: 2022-05-31
Admitting: NURSE PRACTITIONER

## 2022-05-31 ENCOUNTER — APPOINTMENT (OUTPATIENT)
Dept: CARDIOLOGY | Facility: HOSPITAL | Age: 67
End: 2022-05-31

## 2022-05-31 VITALS — WEIGHT: 156 LBS | BODY MASS INDEX: 27.64 KG/M2 | HEIGHT: 63 IN

## 2022-05-31 DIAGNOSIS — R42 LIGHTHEADEDNESS: ICD-10-CM

## 2022-05-31 LAB
MAXIMAL PREDICTED HEART RATE: 156 BPM
STRESS TARGET HR: 133 BPM

## 2022-05-31 PROCEDURE — 93660 TILT TABLE EVALUATION: CPT

## 2022-05-31 PROCEDURE — 93660 TILT TABLE EVALUATION: CPT | Performed by: SPECIALIST

## 2022-06-01 ENCOUNTER — TELEPHONE (OUTPATIENT)
Dept: CARDIOLOGY | Facility: CLINIC | Age: 67
End: 2022-06-01

## 2022-06-01 NOTE — TELEPHONE ENCOUNTER
----- Message from MICHAEL Beeeb sent at 5/31/2022 10:17 AM EDT -----  Notify pt tilt table test was negative for cardiac cause of symptoms. Continue with follow up on 6/7/22 to discuss further with Dr Joe

## 2022-06-05 NOTE — PROGRESS NOTES
[facility]  Cardiology progress Note    Patient Name: Archana Delvalle  : 1955    CHIEF COMPLAINT  Palpitations      Subjective   Subjective     HISTORY OF PRESENT ILLNESS    Archana Delvalle is a 67 y.o. female history of palpitations.  No chest pain or shortness of breath.    Review of Systems:   Constitutional no fever,  no weight loss   Skin no rash   Otolaryngeal no difficulty swallowing   Cardiovascular See HPI   Pulmonary no cough, no sputum production   Gastrointestinal no constipation, no diarrhea   Genitourinary no dysuria, no hematuria   Hematologic no easy bruisability, no abnormal bleeding   Musculoskeletal no muscle pain   Neurologic no dizziness, no falls         Personal History     Social History:  reports that she has never smoked. She has never used smokeless tobacco. She reports that she does not drink alcohol and does not use drugs.    Home Medications:  Current Outpatient Medications on File Prior to Visit   Medication Sig   • albuterol sulfate  (90 Base) MCG/ACT inhaler INHALE 2 PUFFS EVERY 4 HOURS AS NEEDED   • cetirizine (ZyrTEC) 5 MG chewable tablet Chew 5 mg Daily.   • cholecalciferol (VITAMIN D3) 25 MCG (1000 UT) tablet Take 1,000 Units by mouth Daily.   • escitalopram (LEXAPRO) 10 MG tablet Take 10 mg by mouth Daily. Take 1/2 tablet at bedtime   • multivitamin with minerals tablet tablet Take 1 tablet by mouth Daily.   • [DISCONTINUED] metoprolol succinate XL (TOPROL-XL) 25 MG 24 hr tablet Take 1 tablet by mouth Daily.   • [DISCONTINUED] metoprolol succinate XL (TOPROL-XL) 25 MG 24 hr tablet Take 12.5 mg by mouth Daily.   • [DISCONTINUED] rosuvastatin (CRESTOR) 10 MG tablet Take 5 mg by mouth Daily.   • [DISCONTINUED] Auvi-Q 0.3 MG/0.3ML solution auto-injector injection INJECT AS NEEDED FOR SEVERE ALLERGIC REACTION INCLUDING ANAPHYLAXIS AS DIRECTED   • [DISCONTINUED] multivitamin with minerals tablet tablet Take 2 tablets by mouth Daily.   • [DISCONTINUED]  rosuvastatin (CRESTOR) 10 MG tablet TAKE 1 TABLET BY MOUTH EVERY DAY     No current facility-administered medications on file prior to visit.     Allergies:  Allergies   Allergen Reactions   • Codeine Provider Review Needed   • Tetracycline Provider Review Needed   • Levaquin [Levofloxacin] Diarrhea   • Singulair [Montelukast Sodium] Nausea Only       Objective    Objective       Vitals:   Heart Rate:  [79] 79  BP: (107)/(57) 107/57  Body mass index is 28.34 kg/m².     Physical Exam:   Constitutional: Awake, alert, No acute distress    Eyes: PERRLA, sclerae anicteric, no conjunctival injection   HENT: NCAT, mucous membranes moist   Neck: Supple, no thyromegaly, no lymphadenopathy, trachea midline   Respiratory: Clear to auscultation bilaterally, nonlabored respirations    Cardiovascular: RRR, no murmurs or rubs. Palpable pedal pulses bilaterally   Musculoskeletal: No bilateral ankle edema, no cyanosis to extremities   Psychiatric: Appropriate affect, cooperative   Neurologic: Oriented x 3, strength symmetric in all extremities, Cranial Nerves grossly intact to confrontation, speech clear   Skin: No rashes.    Result Review    Result Review:  I have personally reviewed the available results from  [x]  Laboratory  [x]  EKG  [x]  Cardiology  [x]  Medications  [x]  Old records  []  Other:   Procedures  Lab Results   Component Value Date    CHOL 134 05/17/2022    CHOL 169 01/17/2022     Lab Results   Component Value Date    TRIG 150 05/17/2022    TRIG 114 01/17/2022    TRIG 122 03/30/2021     Lab Results   Component Value Date    HDL 44 05/17/2022    HDL 50 01/17/2022    HDL 49 03/30/2021     Lab Results   Component Value Date    LDL 64 05/17/2022    LDL 98 01/17/2022    LDL 71 03/30/2021     Lab Results   Component Value Date    VLDL 26 05/17/2022    VLDL 21 01/17/2022    VLDL 24 03/30/2021     Results for orders placed during the hospital encounter of 04/27/22    Adult Transthoracic Echo Complete W/ Cont if Necessary  Per Protocol    Interpretation Summary  Normal left ventricular systolic function.  No significant valvular abnormalities noted.     Impression/Plan:  1.  Palpitations/tachycardia: Reviewed her 24-hour Holter report.  Shows short runs of supraventricular tachycardia.    Continue Toprol-XL 25 mg half a tablet once a day and see if she is able to tolerate it.   echo was negative with a normal left ventricular systolic function.  2.  Hyperlipidemia: Continue Crestor 5 mg once a day.  Lipid profile reviewed which shows an LDL of 71.  Able to tolerate Crestor without any side effects.  3.  Pituitary adenoma status post surgery: Stable           Brennen Joe MD   06/07/22   09:51 EDT

## 2022-06-07 ENCOUNTER — OFFICE VISIT (OUTPATIENT)
Dept: CARDIOLOGY | Facility: CLINIC | Age: 67
End: 2022-06-07

## 2022-06-07 VITALS
SYSTOLIC BLOOD PRESSURE: 107 MMHG | DIASTOLIC BLOOD PRESSURE: 57 MMHG | BODY MASS INDEX: 28.35 KG/M2 | WEIGHT: 160 LBS | HEART RATE: 79 BPM | HEIGHT: 63 IN

## 2022-06-07 DIAGNOSIS — R00.2 PALPITATIONS: Primary | ICD-10-CM

## 2022-06-07 DIAGNOSIS — E78.2 HYPERLIPEMIA, MIXED: ICD-10-CM

## 2022-06-07 PROCEDURE — 99213 OFFICE O/P EST LOW 20 MIN: CPT | Performed by: SPECIALIST

## 2022-06-07 RX ORDER — ROSUVASTATIN CALCIUM 10 MG/1
5 TABLET, COATED ORAL DAILY
Qty: 45 TABLET | Refills: 3 | Status: SHIPPED | OUTPATIENT
Start: 2022-06-07 | End: 2022-12-14 | Stop reason: SDUPTHER

## 2022-06-07 RX ORDER — METOPROLOL SUCCINATE 25 MG/1
12.5 TABLET, EXTENDED RELEASE ORAL DAILY
Qty: 90 TABLET | Refills: 3 | Status: SHIPPED | OUTPATIENT
Start: 2022-06-07 | End: 2022-12-13

## 2022-06-07 RX ORDER — METOPROLOL SUCCINATE 25 MG/1
12.5 TABLET, EXTENDED RELEASE ORAL DAILY
COMMUNITY
End: 2022-06-07 | Stop reason: SDUPTHER

## 2022-06-07 RX ORDER — ROSUVASTATIN CALCIUM 10 MG/1
5 TABLET, COATED ORAL DAILY
COMMUNITY
End: 2022-06-07 | Stop reason: SDUPTHER

## 2022-07-19 ENCOUNTER — HOSPITAL ENCOUNTER (OUTPATIENT)
Dept: MAMMOGRAPHY | Facility: HOSPITAL | Age: 67
Discharge: HOME | End: 2022-07-19
Admitting: INTERNAL MEDICINE

## 2022-07-19 DIAGNOSIS — Z12.31 BREAST CANCER SCREENING BY MAMMOGRAM: ICD-10-CM

## 2022-07-19 PROCEDURE — 77067 SCR MAMMO BI INCL CAD: CPT

## 2022-07-19 PROCEDURE — 77063 BREAST TOMOSYNTHESIS BI: CPT

## 2022-07-20 ENCOUNTER — TELEPHONE (OUTPATIENT)
Dept: INTERNAL MEDICINE | Facility: CLINIC | Age: 67
End: 2022-07-20

## 2022-07-20 ENCOUNTER — TRANSCRIBE ORDERS (OUTPATIENT)
Dept: INTERNAL MEDICINE | Facility: CLINIC | Age: 67
End: 2022-07-20

## 2022-07-20 DIAGNOSIS — R92.1 BREAST CALCIFICATIONS: Primary | ICD-10-CM

## 2022-07-20 NOTE — TELEPHONE ENCOUNTER
PT called & the hospital was requesting different orders for the Mammo. I called the hospital & got it fixed. They only use certain codes for orders. Pending for you to sign.

## 2022-08-01 ENCOUNTER — HOSPITAL ENCOUNTER (OUTPATIENT)
Dept: ULTRASOUND IMAGING | Facility: HOSPITAL | Age: 67
Discharge: HOME | End: 2022-08-01

## 2022-08-01 ENCOUNTER — HOSPITAL ENCOUNTER (OUTPATIENT)
Dept: MAMMOGRAPHY | Facility: HOSPITAL | Age: 67
Discharge: HOME | End: 2022-08-01

## 2022-08-01 DIAGNOSIS — R92.1 BREAST CALCIFICATIONS: ICD-10-CM

## 2022-08-01 PROCEDURE — G0279 TOMOSYNTHESIS, MAMMO: HCPCS

## 2022-08-01 PROCEDURE — 77065 DX MAMMO INCL CAD UNI: CPT

## 2022-08-11 ENCOUNTER — HOSPITAL ENCOUNTER (OUTPATIENT)
Dept: MAMMOGRAPHY | Facility: HOSPITAL | Age: 67
Discharge: HOME | End: 2022-08-11

## 2022-08-11 ENCOUNTER — DOCUMENTATION (OUTPATIENT)
Dept: MAMMOGRAPHY | Facility: HOSPITAL | Age: 67
End: 2022-08-11

## 2022-08-11 DIAGNOSIS — R92.1 BREAST CALCIFICATIONS: ICD-10-CM

## 2022-08-11 PROCEDURE — 88305 TISSUE EXAM BY PATHOLOGIST: CPT | Performed by: INTERNAL MEDICINE

## 2022-08-11 PROCEDURE — A4648 IMPLANTABLE TISSUE MARKER: HCPCS

## 2022-08-11 PROCEDURE — 76098 X-RAY EXAM SURGICAL SPECIMEN: CPT

## 2022-08-11 PROCEDURE — 0 LIDOCAINE 1 % SOLUTION: Performed by: INTERNAL MEDICINE

## 2022-08-11 RX ORDER — LIDOCAINE HYDROCHLORIDE AND EPINEPHRINE 10; 10 MG/ML; UG/ML
20 INJECTION, SOLUTION INFILTRATION; PERINEURAL ONCE
Status: COMPLETED | OUTPATIENT
Start: 2022-08-11 | End: 2022-08-11

## 2022-08-11 RX ORDER — LIDOCAINE HYDROCHLORIDE 10 MG/ML
10 INJECTION, SOLUTION INFILTRATION; PERINEURAL ONCE
Status: COMPLETED | OUTPATIENT
Start: 2022-08-11 | End: 2022-08-11

## 2022-08-11 RX ADMIN — LIDOCAINE HYDROCHLORIDE 10 ML: 10 INJECTION, SOLUTION INFILTRATION; PERINEURAL at 10:51

## 2022-08-11 RX ADMIN — LIDOCAINE HYDROCHLORIDE AND EPINEPHRINE 20 ML: 10; 10 INJECTION, SOLUTION INFILTRATION; PERINEURAL at 10:51

## 2022-08-12 ENCOUNTER — TELEPHONE (OUTPATIENT)
Dept: INTERNAL MEDICINE | Facility: CLINIC | Age: 67
End: 2022-08-12

## 2022-08-12 PROBLEM — Z98.890 H/O BENIGN BREAST BIOPSY: Status: ACTIVE | Noted: 2022-08-12

## 2022-08-12 LAB
CYTO UR: NORMAL
LAB AP CASE REPORT: NORMAL
LAB AP CLINICAL INFORMATION: NORMAL
PATH REPORT.FINAL DX SPEC: NORMAL
PATH REPORT.GROSS SPEC: NORMAL

## 2022-10-12 NOTE — PROGRESS NOTES
S/W PT AFTER HER BIOPSY ON 8/11/22 AND DISCUSSED DISCHARGE INSTRUCTIONS AND PT V/U. PT REPORTED THAT SHE DOES HAVE HER MAMMOGRAMS YEARLY AND SHE WAS CALLED BACK FOR MORE IMAGES THIS TIME, BUT DID NOT NOTICE ANYTHING DIFFERENT WITH HER BREASTS. SHE WAS 25 WITH FIRST CHILD AND 15 WITH FIRST PERIOD AND 54 WITH HER MENOPAUSE WITH NO HORMONES. FATHER HAD PROSTATE CA AT 82. I GAVE PT MY CARD AND ENCOURAGED HER TO CALL IF NEEDED AND SHE V/U

## 2022-11-08 ENCOUNTER — LAB (OUTPATIENT)
Dept: LAB | Facility: HOSPITAL | Age: 67
End: 2022-11-08

## 2022-11-08 DIAGNOSIS — R00.0 TACHYCARDIA: ICD-10-CM

## 2022-11-08 DIAGNOSIS — R63.5 WEIGHT GAIN: ICD-10-CM

## 2022-11-08 DIAGNOSIS — E78.2 HYPERLIPEMIA, MIXED: ICD-10-CM

## 2022-11-08 DIAGNOSIS — N28.9 ABNORMAL KIDNEY FUNCTION: ICD-10-CM

## 2022-11-08 DIAGNOSIS — R00.2 PALPITATIONS: ICD-10-CM

## 2022-11-08 DIAGNOSIS — R53.83 FATIGUE, UNSPECIFIED TYPE: ICD-10-CM

## 2022-11-08 DIAGNOSIS — E78.5 HYPERLIPIDEMIA, UNSPECIFIED HYPERLIPIDEMIA TYPE: ICD-10-CM

## 2022-11-08 DIAGNOSIS — E55.9 VITAMIN D DEFICIENCY: ICD-10-CM

## 2022-11-08 DIAGNOSIS — E87.1 HYPONATREMIA: ICD-10-CM

## 2022-11-08 LAB
25(OH)D3 SERPL-MCNC: 42.8 NG/ML (ref 30–100)
ALBUMIN SERPL-MCNC: 4.7 G/DL (ref 3.5–5.2)
ALBUMIN/GLOB SERPL: 1.8 G/DL
ALP SERPL-CCNC: 52 U/L (ref 39–117)
ALT SERPL W P-5'-P-CCNC: 12 U/L (ref 1–33)
ANION GAP SERPL CALCULATED.3IONS-SCNC: 10.8 MMOL/L (ref 5–15)
AST SERPL-CCNC: 25 U/L (ref 1–32)
BASOPHILS # BLD AUTO: 0.04 10*3/MM3 (ref 0–0.2)
BASOPHILS NFR BLD AUTO: 0.8 % (ref 0–1.5)
BILIRUB CONJ SERPL-MCNC: <0.2 MG/DL (ref 0–0.3)
BILIRUB SERPL-MCNC: 0.3 MG/DL (ref 0–1.2)
BUN SERPL-MCNC: 10 MG/DL (ref 8–23)
BUN/CREAT SERPL: 13.7 (ref 7–25)
CALCIUM SPEC-SCNC: 9.3 MG/DL (ref 8.6–10.5)
CHLORIDE SERPL-SCNC: 103 MMOL/L (ref 98–107)
CHOLEST SERPL-MCNC: 162 MG/DL (ref 0–200)
CO2 SERPL-SCNC: 28.2 MMOL/L (ref 22–29)
CREAT SERPL-MCNC: 0.73 MG/DL (ref 0.57–1)
DEPRECATED RDW RBC AUTO: 42.9 FL (ref 37–54)
EGFRCR SERPLBLD CKD-EPI 2021: 91.4 ML/MIN/1.73
EOSINOPHIL # BLD AUTO: 0.08 10*3/MM3 (ref 0–0.4)
EOSINOPHIL NFR BLD AUTO: 1.7 % (ref 0.3–6.2)
ERYTHROCYTE [DISTWIDTH] IN BLOOD BY AUTOMATED COUNT: 12 % (ref 12.3–15.4)
FOLATE SERPL-MCNC: >20 NG/ML (ref 4.78–24.2)
GLOBULIN UR ELPH-MCNC: 2.6 GM/DL
GLUCOSE SERPL-MCNC: 90 MG/DL (ref 65–99)
HCT VFR BLD AUTO: 39.6 % (ref 34–46.6)
HDLC SERPL-MCNC: 46 MG/DL (ref 40–60)
HGB BLD-MCNC: 13.1 G/DL (ref 12–15.9)
IMM GRANULOCYTES # BLD AUTO: 0.01 10*3/MM3 (ref 0–0.05)
IMM GRANULOCYTES NFR BLD AUTO: 0.2 % (ref 0–0.5)
LDLC SERPL CALC-MCNC: 85 MG/DL (ref 0–100)
LDLC/HDLC SERPL: 1.73 {RATIO}
LYMPHOCYTES # BLD AUTO: 1.58 10*3/MM3 (ref 0.7–3.1)
LYMPHOCYTES NFR BLD AUTO: 33.5 % (ref 19.6–45.3)
MAGNESIUM SERPL-MCNC: 2.2 MG/DL (ref 1.6–2.4)
MCH RBC QN AUTO: 32 PG (ref 26.6–33)
MCHC RBC AUTO-ENTMCNC: 33.1 G/DL (ref 31.5–35.7)
MCV RBC AUTO: 96.8 FL (ref 79–97)
MONOCYTES # BLD AUTO: 0.47 10*3/MM3 (ref 0.1–0.9)
MONOCYTES NFR BLD AUTO: 10 % (ref 5–12)
NEUTROPHILS NFR BLD AUTO: 2.53 10*3/MM3 (ref 1.7–7)
NEUTROPHILS NFR BLD AUTO: 53.8 % (ref 42.7–76)
NRBC BLD AUTO-RTO: 0 /100 WBC (ref 0–0.2)
PLATELET # BLD AUTO: 190 10*3/MM3 (ref 140–450)
PMV BLD AUTO: 12.7 FL (ref 6–12)
POTASSIUM SERPL-SCNC: 3.8 MMOL/L (ref 3.5–5.2)
PROT SERPL-MCNC: 7.3 G/DL (ref 6–8.5)
RBC # BLD AUTO: 4.09 10*6/MM3 (ref 3.77–5.28)
SODIUM SERPL-SCNC: 142 MMOL/L (ref 136–145)
T4 FREE SERPL-MCNC: 0.98 NG/DL (ref 0.93–1.7)
TRIGL SERPL-MCNC: 182 MG/DL (ref 0–150)
TSH SERPL DL<=0.05 MIU/L-ACNC: 3.54 UIU/ML (ref 0.27–4.2)
VIT B12 BLD-MCNC: 722 PG/ML (ref 211–946)
VLDLC SERPL-MCNC: 31 MG/DL (ref 5–40)
WBC NRBC COR # BLD: 4.71 10*3/MM3 (ref 3.4–10.8)

## 2022-11-08 PROCEDURE — 85025 COMPLETE CBC W/AUTO DIFF WBC: CPT

## 2022-11-08 PROCEDURE — 82746 ASSAY OF FOLIC ACID SERUM: CPT

## 2022-11-08 PROCEDURE — 83735 ASSAY OF MAGNESIUM: CPT

## 2022-11-08 PROCEDURE — 84443 ASSAY THYROID STIM HORMONE: CPT

## 2022-11-08 PROCEDURE — 82607 VITAMIN B-12: CPT

## 2022-11-08 PROCEDURE — 80061 LIPID PANEL: CPT

## 2022-11-08 PROCEDURE — 82306 VITAMIN D 25 HYDROXY: CPT

## 2022-11-08 PROCEDURE — 82248 BILIRUBIN DIRECT: CPT

## 2022-11-08 PROCEDURE — 84439 ASSAY OF FREE THYROXINE: CPT

## 2022-11-08 PROCEDURE — 36415 COLL VENOUS BLD VENIPUNCTURE: CPT

## 2022-11-08 PROCEDURE — 80053 COMPREHEN METABOLIC PANEL: CPT

## 2022-11-17 ENCOUNTER — OFFICE VISIT (OUTPATIENT)
Dept: INTERNAL MEDICINE | Facility: CLINIC | Age: 67
End: 2022-11-17

## 2022-11-17 VITALS
BODY MASS INDEX: 29.59 KG/M2 | RESPIRATION RATE: 16 BRPM | OXYGEN SATURATION: 98 % | SYSTOLIC BLOOD PRESSURE: 107 MMHG | WEIGHT: 167 LBS | DIASTOLIC BLOOD PRESSURE: 60 MMHG | HEART RATE: 84 BPM | TEMPERATURE: 98 F | HEIGHT: 63 IN

## 2022-11-17 DIAGNOSIS — E78.2 HYPERLIPEMIA, MIXED: Primary | ICD-10-CM

## 2022-11-17 DIAGNOSIS — Z86.018 HISTORY OF PITUITARY ADENOMA: ICD-10-CM

## 2022-11-17 DIAGNOSIS — E53.8 VITAMIN B 12 DEFICIENCY: ICD-10-CM

## 2022-11-17 DIAGNOSIS — Z13.6 ENCOUNTER FOR SCREENING FOR CARDIOVASCULAR DISORDERS: ICD-10-CM

## 2022-11-17 DIAGNOSIS — J01.00 ACUTE MAXILLARY SINUSITIS, RECURRENCE NOT SPECIFIED: ICD-10-CM

## 2022-11-17 DIAGNOSIS — E55.9 VITAMIN D DEFICIENCY: ICD-10-CM

## 2022-11-17 DIAGNOSIS — M81.0 POSTMENOPAUSAL OSTEOPOROSIS: ICD-10-CM

## 2022-11-17 PROCEDURE — 99214 OFFICE O/P EST MOD 30 MIN: CPT | Performed by: INTERNAL MEDICINE

## 2022-11-17 RX ORDER — AMOXICILLIN 875 MG/1
TABLET, COATED ORAL
Qty: 14 TABLET | Refills: 0 | Status: SHIPPED | OUTPATIENT
Start: 2022-11-17 | End: 2023-01-10

## 2022-11-17 NOTE — ASSESSMENT & PLAN NOTE
She had pituitary adenoma resected by Dr. Leahy in [place].  It was picked up incidentally on an MRI checking for sinusitis by ENT.  Pituitary test were all normal earlier this year.  Assessment: Status post pituitary adenoma resection.  Clinically stable.  Follows with neurosurgeon later this fall.  Plan: Patient can have pituitary endocrine hormones checked once a year

## 2022-11-17 NOTE — PROGRESS NOTES
CHIEF COMPLAINT  Archana Delvalle presents to [facility] for follow-up of Labs Only (Routine office visit with labs. ) and Sinusitis (Pt states she would like antibiotic. Pt has nasal drainage. Pt has yellow mucus. Slight sinus headache. ).    HPI    67-year-old lady here for 6 month check up.    Complain of yellow nasal drainage congestion for the past 4 days.  No fever or chills mild headache.  Exposed to her grandkids who were diagnosed with an upper respiratory infection    Needs endocrine labs for her history of prolactinoma due in January    [facility]- No chest pain or shortness of breath now.  No more recent palpitations.  She had bilateral cataract extractions done the most recent one was done since her last visit by Dr. Gomez.  The other would on the right with stents in 2019.  She seemed very well.  COVID illness in November 2020 before vaccines.  Up-to-date on COVID vaccines.  History of MVP and occasional palpitations and has seen cardiology periodically for that.  Negative colonoscopy in 2018.  History of mild asthma and allergies.  Had pulmonary functions in 2020 that showed no evidence of rash obstruction.  No response to bronchodilator and no restriction.  Diffusing capacity was normal.  FEV1/FVC was 82%.  FEV1 was 110% predicted.  She is to have a repeat colonoscopy in 2023 for follow-up on 1 TA polyp in March 2018.  Perennial allergies to dust and molds.  Previous allergy evaluation.  She has been on Flonase and Zyrtec in the past.  Has ProAir to use as needed.     -was [facility] for [facility]-one son with 2 grandkids-lives nearby    Current Outpatient Medications:   •  albuterol sulfate  (90 Base) MCG/ACT inhaler, INHALE 2 PUFFS EVERY 4 HOURS AS NEEDED, Disp: 8.5 g, Rfl: 1  •  BIOTIN PO, Take  by mouth. GUMMY, Disp: , Rfl:   •  cetirizine (ZyrTEC) 5 MG chewable tablet, Chew 5 mg Daily., Disp: , Rfl:   •  cholecalciferol (VITAMIN D3) 25 MCG (1000 UT) tablet, Take  "1,000 Units by mouth Daily., Disp: , Rfl:   •  metoprolol succinate XL (TOPROL-XL) 25 MG 24 hr tablet, Take 0.5 tablets by mouth Daily., Disp: 90 tablet, Rfl: 3  •  multivitamin with minerals tablet tablet, Take 1 tablet by mouth Daily., Disp: , Rfl:   •  rosuvastatin (CRESTOR) 10 MG tablet, Take 0.5 tablets by mouth Daily., Disp: 45 tablet, Rfl: 3  •  amoxicillin (AMOXIL) 875 MG tablet, One tab po BID, Disp: 14 tablet, Rfl: 0     PFSH reviewed.  ROS-significant for congestion headache no fever chills , but withpostnasal drainage    OBJECTIVE  Vital Signs  Vitals:    11/17/22 0931   BP: 107/60   BP Location: Left arm   Patient Position: Sitting   Cuff Size: Adult   Pulse: 84   Resp: 16   Temp: 98 °F (36.7 °C)   TempSrc: Temporal   SpO2: 98%   Weight: 75.8 kg (167 lb)   Height: 160 cm (63\")      Body mass index is 29.58 kg/m².    Physical Exam  Vitals and nursing note reviewed.   Constitutional:       Appearance: Normal appearance.   HENT:      Head: Normocephalic and atraumatic.   Cardiovascular:      Rate and Rhythm: Normal rate and regular rhythm.   Pulmonary:      Effort: Pulmonary effort is normal.      Breath sounds: Normal breath sounds.   Abdominal:      Palpations: Abdomen is soft.   Musculoskeletal:      Cervical back: Normal range of motion and neck supple.   Neurological:      General: No focal deficit present.      Mental Status: She is alert and oriented to person, place, and time.          RESULTS REVIEW  No results found for: PROBNP, BNP  CMP    CMP 3/27/22 5/17/22 11/8/22   Glucose 86 87 90   BUN 14 11 10   Creatinine 0.80 0.74 0.73   Sodium 141 143 142   Potassium 3.7 4.1 3.8   Chloride 102 105 103   Calcium 9.4 9.0 9.3   Albumin 4.80 4.50 4.70   Total Bilirubin 0.2 0.4 0.3   Alkaline Phosphatase 60 51 52   AST (SGOT) 23 23 25   ALT (SGPT) 15 15 12           CBC w/diff    CBC w/Diff 3/27/22 5/17/22 11/8/22   WBC 8.04 4.59 4.71   RBC 4.22 3.93 4.09   Hemoglobin 13.3 12.8 13.1   Hematocrit 40.1 38.1 " 39.6   MCV 95.0 96.9 96.8   MCH 31.5 32.6 32.0   MCHC 33.2 33.6 33.1   RDW 12.9 12.3 12.0 (A)   Platelets 230 186 190   Neutrophil Rel % 61.5 44.4 53.8   Immature Granulocyte Rel % 0.2 0.2 0.2   Lymphocyte Rel % 28.4 38.6 33.5   Monocyte Rel % 8.6 13.7 (A) 10.0   Eosinophil Rel % 0.6 2.0 1.7   Basophil Rel % 0.7 1.1 0.8   (A) Abnormal value             Lipid Panel    Lipid Panel 1/17/22 5/17/22 11/8/22   Total Cholesterol 169 134 162   Triglycerides 114 150 182 (A)   HDL Cholesterol 50 44 46   VLDL Cholesterol 21 26 31   LDL Cholesterol  98 64 85   LDL/HDL Ratio 1.92 1.36 1.73   (A) Abnormal value             Lab Results   Component Value Date    TSH 3.540 11/08/2022    TSH 2.280 05/17/2022    TSH 1.940 01/17/2022      Lab Results   Component Value Date    FREET4 0.98 11/08/2022    FREET4 1.04 01/17/2022    FREET4 0.84 07/09/2021         Lab Results   Component Value Date    QLHECRCB44 722 11/08/2022    ZVBX60BH 42.8 11/08/2022    MG 2.2 11/08/2022        Mammo Post Clip Placement Left    Addendum Date: 8/19/2022    ADDENDUM:  COMPARISON: [facility], , MAMMO STEREOTACTIC BREAST SPECIMEN LEFT, 8/11/2022, 10:35. Pathology addendum: Left breast, lower inner quadrant, stereotactic-guided core biopsy:              - Fibroadenomatoid change with microcalcifications        - Negative for atypia and malignancy  The pathology findings are concordant with the imaging findings.  Recommend patient resume annual screening mammography in 1 year.     CRISTINA NATARAJAN MD       Electronically Signed and Approved By: CRISTINA NATARAJAN MD on 8/19/2022 at 17:11             Result Date: 8/19/2022    Technically successful stereotactic biopsy of the left breast, with the biopsy clip in expected position. Specimens were sent to pathology. After final pathology has been reviewed, an addendum will be dictated for concordance and further recommendations.     CRISTINA NATARAJAN MD       Electronically Signed and Approved By:  CRISTINA NATARAJAN MD on 8/11/2022 at 11:29             Mammo Stereotactic Breast Biopsy Initial With & Without Device    Addendum Date: 8/19/2022    ADDENDUM:  COMPARISON: [facility], MAMMO STEREOTACTIC BREAST SPECIMEN LEFT, 8/11/2022, 10:35. Pathology addendum: Left breast, lower inner quadrant, stereotactic-guided core biopsy:              - Fibroadenomatoid change with microcalcifications        - Negative for atypia and malignancy  The pathology findings are concordant with the imaging findings.  Recommend patient resume annual screening mammography in 1 year.     CRISTINA NATARAJAN MD       Electronically Signed and Approved By: CRISTINA NATARAJAN MD on 8/19/2022 at 17:11             Result Date: 8/19/2022    Technically successful stereotactic biopsy of the left breast, with the biopsy clip in expected position. Specimens were sent to pathology. After final pathology has been reviewed, an addendum will be dictated for concordance and further recommendations.     CRISTINA NATARAJAN MD       Electronically Signed and Approved By: CRISTINA NATARAJAN MD on 8/11/2022 at 11:29             Mammo Stereotactic Breast Specimen Left    Addendum Date: 8/19/2022    ADDENDUM:  COMPARISON: [facility], MAMMO STEREOTACTIC BREAST SPECIMEN LEFT, 8/11/2022, 10:35. Pathology addendum: Left breast, lower inner quadrant, stereotactic-guided core biopsy:              - Fibroadenomatoid change with microcalcifications        - Negative for atypia and malignancy  The pathology findings are concordant with the imaging findings.  Recommend patient resume annual screening mammography in 1 year.     CRISTINA NATARAJAN MD       Electronically Signed and Approved By: CRISTINA NATARAJAN MD on 8/19/2022 at 17:11             Result Date: 8/19/2022    Technically successful stereotactic biopsy of the left breast, with the biopsy clip in expected position. Specimens were sent to pathology. After final pathology has been  reviewed, an addendum will be dictated for concordance and further recommendations.     CRISTINA NATARAJAN MD       Electronically Signed and Approved By: CRISTINA NATARAJAN MD on 8/11/2022 at 11:29                        ASSESSMENT & PLAN  Diagnoses and all orders for this visit:    1. Hyperlipemia, mixed (Primary)  Assessment & Plan:  Lipid panel is excellent.  Assessment: Hyperlipidemia on rosuvastatin.  Plan: Rosuvastatin 10 mg at bedtime    Orders:  -     CBC & Differential; Future  -     Comprehensive Metabolic Panel; Future  -     Lipid Panel; Future  -     TSH+Free T4; Future  -     T3, Free; Future  -     Vitamin B12; Future  -     Folate; Future  -     Vitamin D,25-Hydroxy; Future  -     Magnesium; Future  -     Prolactin; Future  -     FSH & LH; Future  -     Cortisol; Future  -     Insulin-like Growth Factor; Future  -     Urinalysis With Microscopic If Indicated (No Culture) - Urine, Clean Catch; Future    2. History of pituitary adenoma  Assessment & Plan:  She had pituitary adenoma resected by Dr. Leahy in [place].  It was picked up incidentally on an MRI checking for sinusitis by ENT.  Pituitary test were all normal earlier this year.  Assessment: Status post pituitary adenoma resection.  Clinically stable.  Follows with neurosurgeon later this fall.  Plan: Patient can have pituitary endocrine hormones checked once a year    Orders:  -     Prolactin; Future  -     FSH & LH; Future  -     Cortisol; Future  -     Insulin-like Growth Factor; Future    3. Postmenopausal osteoporosis  Comments:  Due DEXA scan  Orders:  -     DEXA Bone Density Axial; Future    4. Acute maxillary sinusitis, recurrence not specified  Comments:  amoxicillin, flonase NS as directed  Orders:  -     amoxicillin (AMOXIL) 875 MG tablet; One tab po BID  Dispense: 14 tablet; Refill: 0    5. Encounter for screening for cardiovascular disorders    6. Vitamin D deficiency  -     Vitamin D,25-Hydroxy; Future    7. Vitamin B 12  deficiency  -     Vitamin B12; Future  -     Folate; Future    Plan  Due for repeat colonoscopy 2023.  Last one was 2018 and had 1 TA polyp  Prevnar-20 given 8/2022  Needs yearly mammogram screening in 1 year August 1, 2023 had stereotactic biopsy then which was negative for atypia or malignancy  Schedule DEXA scan  Had covid vaccine 11/2022  Shingrix vaccines at pharmacy      FOLLOW UP  Return in about 3 months (around 2/17/2023) for Medicare Wellness.    Patient was given instructions and counseling regarding her condition or for health maintenance advice. Please see specific information pulled into the AVS if appropriate.

## 2022-12-13 RX ORDER — METOPROLOL SUCCINATE 25 MG/1
12.5 TABLET, EXTENDED RELEASE ORAL DAILY
Qty: 45 TABLET | Refills: 0 | Status: SHIPPED | OUTPATIENT
Start: 2022-12-13

## 2022-12-14 RX ORDER — ROSUVASTATIN CALCIUM 10 MG/1
5 TABLET, COATED ORAL DAILY
Qty: 45 TABLET | Refills: 3 | Status: SHIPPED | OUTPATIENT
Start: 2022-12-14

## 2023-01-07 NOTE — PROGRESS NOTES
[facility]  Cardiology progress Note    Patient Name: Archana Delvalle  : 1955    CHIEF COMPLAINT  Palpitations        Subjective   Subjective     HISTORY OF PRESENT ILLNESS    Archana Delvalle is a 67 y.o. female with history of palpitations and tachycardia.  No chest pain or shortness of breath.    REVIEW OF SYSTEMS    Constitutional:    No fever, no weight loss  Skin:     No rash  Otolaryngeal:    No difficulty swallowing  Cardiovascular: See HPI.  Pulmonary:    No cough, no sputum production    Personal History     Social History:    reports that she has never smoked. She has never used smokeless tobacco. She reports that she does not drink alcohol and does not use drugs.    Home Medications:  Current Outpatient Medications on File Prior to Visit   Medication Sig   • albuterol sulfate  (90 Base) MCG/ACT inhaler INHALE 2 PUFFS EVERY 4 HOURS AS NEEDED   • BIOTIN PO Take  by mouth. GUMMY   • cetirizine (ZyrTEC) 5 MG chewable tablet Chew 5 mg Daily.   • cholecalciferol (VITAMIN D3) 25 MCG (1000 UT) tablet Take 1,000 Units by mouth Daily.   • metoprolol succinate XL (TOPROL-XL) 25 MG 24 hr tablet Take 0.5 tablets by mouth Daily.   • multivitamin with minerals tablet tablet Take 1 tablet by mouth Daily.   • rosuvastatin (CRESTOR) 10 MG tablet Take 0.5 tablets by mouth Daily.   • [DISCONTINUED] amoxicillin (AMOXIL) 875 MG tablet One tab po BID     No current facility-administered medications on file prior to visit.       Past Medical History:   Diagnosis Date   • Asthma    • High cholesterol    • Tachycardia        Allergies:  Allergies   Allergen Reactions   • Codeine Rash   • Tetracycline Rash   • Levaquin [Levofloxacin] Diarrhea   • Singulair [Montelukast Sodium] Nausea Only       Objective    Objective       Vitals:   Heart Rate:  [74] 74  BP: (120)/(66) 120/66  Body mass index is 29.94 kg/m².     PHYSICAL EXAM:    General Appearance:   · well developed  · well nourished  HENT:    · oropharynx moist  · lips not cyanotic  Neck:  · thyroid not enlarged  · supple  Respiratory:  · no respiratory distress  · normal breath sounds  · no rales  Cardiovascular:  · no jugular venous distention  · regular rhythm  · apical impulse normal  · S1 normal, S2 normal  · no S3, no S4   · no murmur  · no rub, no thrill  · carotid pulses normal; no bruit  · pedal pulses normal  · lower extremity edema: none    Skin:   · warm, dry  Psychiatric:  · judgement and insight appropriate  · normal mood and affect        Result Review:  I have personally reviewed the available results from  [x]  Laboratory  [x]  EKG  [x]  Cardiology  [x]  Medications  [x]  Old records  []  Other:     Procedures  Lab Results   Component Value Date    CHOL 162 11/08/2022    CHOL 134 05/17/2022    CHOL 169 01/17/2022     Lab Results   Component Value Date    TRIG 182 (H) 11/08/2022    TRIG 150 05/17/2022    TRIG 114 01/17/2022     Lab Results   Component Value Date    HDL 46 11/08/2022    HDL 44 05/17/2022    HDL 50 01/17/2022     Lab Results   Component Value Date    LDL 85 11/08/2022    LDL 64 05/17/2022    LDL 98 01/17/2022     Lab Results   Component Value Date    VLDL 31 11/08/2022    VLDL 26 05/17/2022    VLDL 21 01/17/2022     Results for orders placed during the hospital encounter of 04/27/22    Adult Transthoracic Echo Complete W/ Cont if Necessary Per Protocol    Interpretation Summary  Normal left ventricular systolic function.  No significant valvular abnormalities noted.     Impression/Plan:  1.  Palpitations/tachycardia: Continue Toprol-XL 12. 5 mg once a day.  No further palpitations echocardiogram within normal limits.  2.  Hyperlipidemia: Continue Crestor 5 mg a day.  Lipid profile reviewed shows an LDL of 85.  monitor lipid and hepatic profile.           Brennen Joe MD   01/10/23   09:40 EST

## 2023-01-10 ENCOUNTER — OFFICE VISIT (OUTPATIENT)
Dept: CARDIOLOGY | Facility: CLINIC | Age: 68
End: 2023-01-10
Payer: MEDICARE

## 2023-01-10 VITALS
SYSTOLIC BLOOD PRESSURE: 120 MMHG | HEIGHT: 63 IN | HEART RATE: 74 BPM | WEIGHT: 169 LBS | BODY MASS INDEX: 29.95 KG/M2 | DIASTOLIC BLOOD PRESSURE: 66 MMHG

## 2023-01-10 DIAGNOSIS — E78.2 HYPERLIPEMIA, MIXED: ICD-10-CM

## 2023-01-10 DIAGNOSIS — R00.2 PALPITATIONS: Primary | ICD-10-CM

## 2023-01-10 PROCEDURE — 99214 OFFICE O/P EST MOD 30 MIN: CPT | Performed by: SPECIALIST

## 2023-02-07 ENCOUNTER — HOSPITAL ENCOUNTER (OUTPATIENT)
Dept: BONE DENSITY | Facility: HOSPITAL | Age: 68
Discharge: HOME | End: 2023-02-07
Admitting: INTERNAL MEDICINE
Payer: MEDICARE

## 2023-02-07 DIAGNOSIS — M81.0 POSTMENOPAUSAL OSTEOPOROSIS: ICD-10-CM

## 2023-02-07 PROCEDURE — 77080 DXA BONE DENSITY AXIAL: CPT

## 2023-03-21 ENCOUNTER — LAB (OUTPATIENT)
Dept: LAB | Facility: HOSPITAL | Age: 68
End: 2023-03-21
Payer: MEDICARE

## 2023-03-21 DIAGNOSIS — E55.9 VITAMIN D DEFICIENCY: ICD-10-CM

## 2023-03-21 DIAGNOSIS — E53.8 VITAMIN B 12 DEFICIENCY: ICD-10-CM

## 2023-03-21 DIAGNOSIS — Z86.018 HISTORY OF PITUITARY ADENOMA: ICD-10-CM

## 2023-03-21 DIAGNOSIS — E78.2 HYPERLIPEMIA, MIXED: ICD-10-CM

## 2023-03-21 LAB
25(OH)D3 SERPL-MCNC: 37 NG/ML (ref 30–100)
ALBUMIN SERPL-MCNC: 4.7 G/DL (ref 3.5–5.2)
ALBUMIN/GLOB SERPL: 1.8 G/DL
ALP SERPL-CCNC: 59 U/L (ref 39–117)
ALT SERPL W P-5'-P-CCNC: 15 U/L (ref 1–33)
ANION GAP SERPL CALCULATED.3IONS-SCNC: 11.6 MMOL/L (ref 5–15)
AST SERPL-CCNC: 25 U/L (ref 1–32)
BACTERIA UR QL AUTO: ABNORMAL /HPF
BASOPHILS # BLD AUTO: 0.04 10*3/MM3 (ref 0–0.2)
BASOPHILS NFR BLD AUTO: 0.8 % (ref 0–1.5)
BILIRUB SERPL-MCNC: 0.3 MG/DL (ref 0–1.2)
BILIRUB UR QL STRIP: NEGATIVE
BUN SERPL-MCNC: 10 MG/DL (ref 8–23)
BUN/CREAT SERPL: 14.3 (ref 7–25)
CALCIUM SPEC-SCNC: 9.4 MG/DL (ref 8.6–10.5)
CHLORIDE SERPL-SCNC: 102 MMOL/L (ref 98–107)
CHOLEST SERPL-MCNC: 151 MG/DL (ref 0–200)
CLARITY UR: CLEAR
CO2 SERPL-SCNC: 25.4 MMOL/L (ref 22–29)
COLOR UR: YELLOW
CORTIS SERPL-MCNC: 12.5 MCG/DL
CREAT SERPL-MCNC: 0.7 MG/DL (ref 0.57–1)
DEPRECATED RDW RBC AUTO: 41.7 FL (ref 37–54)
EGFRCR SERPLBLD CKD-EPI 2021: 96.1 ML/MIN/1.73
EOSINOPHIL # BLD AUTO: 0.11 10*3/MM3 (ref 0–0.4)
EOSINOPHIL NFR BLD AUTO: 2.2 % (ref 0.3–6.2)
ERYTHROCYTE [DISTWIDTH] IN BLOOD BY AUTOMATED COUNT: 12.1 % (ref 12.3–15.4)
FOLATE SERPL-MCNC: >20 NG/ML (ref 4.78–24.2)
FSH SERPL-ACNC: 56.1 MIU/ML
GLOBULIN UR ELPH-MCNC: 2.6 GM/DL
GLUCOSE SERPL-MCNC: 90 MG/DL (ref 65–99)
GLUCOSE UR STRIP-MCNC: NEGATIVE MG/DL
HCT VFR BLD AUTO: 38.6 % (ref 34–46.6)
HDLC SERPL-MCNC: 42 MG/DL (ref 40–60)
HGB BLD-MCNC: 13.1 G/DL (ref 12–15.9)
HGB UR QL STRIP.AUTO: ABNORMAL
HYALINE CASTS UR QL AUTO: ABNORMAL /LPF
KETONES UR QL STRIP: NEGATIVE
LDLC SERPL CALC-MCNC: 77 MG/DL (ref 0–100)
LDLC/HDLC SERPL: 1.68 {RATIO}
LEUKOCYTE ESTERASE UR QL STRIP.AUTO: ABNORMAL
LH SERPL-ACNC: 18.9 MIU/ML
LYMPHOCYTES # BLD AUTO: 1.79 10*3/MM3 (ref 0.7–3.1)
LYMPHOCYTES NFR BLD AUTO: 35.6 % (ref 19.6–45.3)
MAGNESIUM SERPL-MCNC: 2.2 MG/DL (ref 1.6–2.4)
MCH RBC QN AUTO: 32.2 PG (ref 26.6–33)
MCHC RBC AUTO-ENTMCNC: 33.9 G/DL (ref 31.5–35.7)
MCV RBC AUTO: 94.8 FL (ref 79–97)
MONOCYTES # BLD AUTO: 0.51 10*3/MM3 (ref 0.1–0.9)
MONOCYTES NFR BLD AUTO: 10.1 % (ref 5–12)
NEUTROPHILS NFR BLD AUTO: 2.57 10*3/MM3 (ref 1.7–7)
NEUTROPHILS NFR BLD AUTO: 51.1 % (ref 42.7–76)
NITRITE UR QL STRIP: NEGATIVE
PH UR STRIP.AUTO: 6 [PH] (ref 5–8)
PLATELET # BLD AUTO: 192 10*3/MM3 (ref 140–450)
PMV BLD AUTO: 13.2 FL (ref 6–12)
POTASSIUM SERPL-SCNC: 4 MMOL/L (ref 3.5–5.2)
PROLACTIN SERPL-MCNC: 7.89 NG/ML (ref 4.79–23.3)
PROT SERPL-MCNC: 7.3 G/DL (ref 6–8.5)
PROT UR QL STRIP: NEGATIVE
RBC # BLD AUTO: 4.07 10*6/MM3 (ref 3.77–5.28)
RBC # UR STRIP: ABNORMAL /HPF
REF LAB TEST METHOD: ABNORMAL
SODIUM SERPL-SCNC: 139 MMOL/L (ref 136–145)
SP GR UR STRIP: 1.02 (ref 1–1.03)
SQUAMOUS #/AREA URNS HPF: ABNORMAL /HPF
T3FREE SERPL-MCNC: 2.86 PG/ML (ref 2–4.4)
T4 FREE SERPL-MCNC: 1.03 NG/DL (ref 0.93–1.7)
TRIGL SERPL-MCNC: 193 MG/DL (ref 0–150)
TSH SERPL DL<=0.05 MIU/L-ACNC: 2.75 UIU/ML (ref 0.27–4.2)
UROBILINOGEN UR QL STRIP: ABNORMAL
VIT B12 BLD-MCNC: 609 PG/ML (ref 211–946)
VLDLC SERPL-MCNC: 32 MG/DL (ref 5–40)
WBC # UR STRIP: ABNORMAL /HPF
WBC NRBC COR # BLD: 5.03 10*3/MM3 (ref 3.4–10.8)

## 2023-03-21 PROCEDURE — 80061 LIPID PANEL: CPT

## 2023-03-21 PROCEDURE — 83735 ASSAY OF MAGNESIUM: CPT

## 2023-03-21 PROCEDURE — 84443 ASSAY THYROID STIM HORMONE: CPT

## 2023-03-21 PROCEDURE — 84146 ASSAY OF PROLACTIN: CPT

## 2023-03-21 PROCEDURE — 85025 COMPLETE CBC W/AUTO DIFF WBC: CPT

## 2023-03-21 PROCEDURE — 82306 VITAMIN D 25 HYDROXY: CPT

## 2023-03-21 PROCEDURE — 84439 ASSAY OF FREE THYROXINE: CPT

## 2023-03-21 PROCEDURE — 84305 ASSAY OF SOMATOMEDIN: CPT

## 2023-03-21 PROCEDURE — 83001 ASSAY OF GONADOTROPIN (FSH): CPT

## 2023-03-21 PROCEDURE — 36415 COLL VENOUS BLD VENIPUNCTURE: CPT

## 2023-03-21 PROCEDURE — 82607 VITAMIN B-12: CPT

## 2023-03-21 PROCEDURE — 80053 COMPREHEN METABOLIC PANEL: CPT

## 2023-03-21 PROCEDURE — 84481 FREE ASSAY (FT-3): CPT

## 2023-03-21 PROCEDURE — 81001 URINALYSIS AUTO W/SCOPE: CPT

## 2023-03-21 PROCEDURE — 83002 ASSAY OF GONADOTROPIN (LH): CPT

## 2023-03-21 PROCEDURE — 82746 ASSAY OF FOLIC ACID SERUM: CPT

## 2023-03-21 PROCEDURE — 82533 TOTAL CORTISOL: CPT

## 2023-03-24 LAB — IGF-I SERPL-MCNC: 85 NG/ML (ref 57–202)

## 2023-03-30 ENCOUNTER — OFFICE VISIT (OUTPATIENT)
Dept: INTERNAL MEDICINE | Facility: CLINIC | Age: 68
End: 2023-03-30
Payer: MEDICARE

## 2023-03-30 VITALS
TEMPERATURE: 97.7 F | BODY MASS INDEX: 29.41 KG/M2 | OXYGEN SATURATION: 100 % | HEART RATE: 83 BPM | SYSTOLIC BLOOD PRESSURE: 122 MMHG | WEIGHT: 166 LBS | RESPIRATION RATE: 14 BRPM | DIASTOLIC BLOOD PRESSURE: 68 MMHG | HEIGHT: 63 IN

## 2023-03-30 DIAGNOSIS — J30.1 NON-SEASONAL ALLERGIC RHINITIS DUE TO POLLEN: ICD-10-CM

## 2023-03-30 DIAGNOSIS — E78.00 PURE HYPERCHOLESTEROLEMIA: ICD-10-CM

## 2023-03-30 DIAGNOSIS — E78.2 HYPERLIPEMIA, MIXED: ICD-10-CM

## 2023-03-30 DIAGNOSIS — Z00.00 ENCOUNTER FOR SUBSEQUENT ANNUAL WELLNESS VISIT (AWV) IN MEDICARE PATIENT: Primary | ICD-10-CM

## 2023-03-30 DIAGNOSIS — Z12.31 SCREENING MAMMOGRAM FOR BREAST CANCER: ICD-10-CM

## 2023-03-30 DIAGNOSIS — Z86.018 HISTORY OF PITUITARY ADENOMA: ICD-10-CM

## 2023-03-30 DIAGNOSIS — M81.0 OSTEOPOROSIS WITHOUT CURRENT PATHOLOGICAL FRACTURE, UNSPECIFIED OSTEOPOROSIS TYPE: ICD-10-CM

## 2023-03-30 DIAGNOSIS — R30.0 DYSURIA: ICD-10-CM

## 2023-03-30 DIAGNOSIS — R31.29 OTHER MICROSCOPIC HEMATURIA: ICD-10-CM

## 2023-03-30 DIAGNOSIS — E55.9 VITAMIN D DEFICIENCY: ICD-10-CM

## 2023-03-30 DIAGNOSIS — N30.91 CYSTITIS WITH HEMATURIA: ICD-10-CM

## 2023-03-30 DIAGNOSIS — E66.9 OBESITY (BMI 30-39.9): ICD-10-CM

## 2023-03-30 DIAGNOSIS — Z12.11 SCREENING FOR MALIGNANT NEOPLASM OF COLON: ICD-10-CM

## 2023-03-30 PROBLEM — R31.21 ASYMPTOMATIC MICROSCOPIC HEMATURIA: Status: ACTIVE | Noted: 2023-03-30

## 2023-03-30 LAB
BILIRUB BLD-MCNC: NEGATIVE MG/DL
CLARITY, POC: CLEAR
COLOR UR: YELLOW
EXPIRATION DATE: ABNORMAL
GLUCOSE UR STRIP-MCNC: NEGATIVE MG/DL
KETONES UR QL: NEGATIVE
LEUKOCYTE EST, POC: ABNORMAL
Lab: ABNORMAL
NITRITE UR-MCNC: NEGATIVE MG/ML
PH UR: 6 [PH] (ref 5–8)
PROT UR STRIP-MCNC: NEGATIVE MG/DL
RBC # UR STRIP: ABNORMAL /UL
SP GR UR: 1.03 (ref 1–1.03)
UROBILINOGEN UR QL: ABNORMAL

## 2023-03-30 PROCEDURE — 87086 URINE CULTURE/COLONY COUNT: CPT | Performed by: INTERNAL MEDICINE

## 2023-03-30 RX ORDER — NITROFURANTOIN 25; 75 MG/1; MG/1
100 CAPSULE ORAL 2 TIMES DAILY
Qty: 10 CAPSULE | Refills: 0 | Status: SHIPPED | OUTPATIENT
Start: 2023-03-30

## 2023-03-30 NOTE — PROGRESS NOTES
The ABCs of the Annual Wellness Visit  [place] to [place] Visit    Subjective     Archana Delvalle is a 67 y.o. female who presents for a  Welcome to Medicare Visit.    The following portions of the patient's history were reviewed and   updated as appropriate: allergies, current medications, past family history, past medical history, past social history, past surgical history and problem list.     Compared to one year ago, the patient feels her physical   health is the same.    Compared to one year ago, the patient feels her mental   health is the same.    Recent Hospitalizations:  She was not admitted to the hospital during the last year.       Current Medical Providers:  Patient Care Team:  Karena Negron MD as PCP - General (Internal Medicine)    Outpatient Medications Prior to Visit   Medication Sig Dispense Refill   • albuterol sulfate  (90 Base) MCG/ACT inhaler INHALE 2 PUFFS EVERY 4 HOURS AS NEEDED 8.5 g 1   • BIOTIN PO Take  by mouth. GUMMY     • cetirizine (ZyrTEC) 5 MG chewable tablet Chew 1 tablet Daily.     • cholecalciferol (VITAMIN D3) 25 MCG (1000 UT) tablet Take 1 tablet by mouth Daily.     • metoprolol succinate XL (TOPROL-XL) 25 MG 24 hr tablet Take 0.5 tablets by mouth Daily. 45 tablet 0   • multivitamin with minerals tablet tablet Take 1 tablet by mouth Daily.     • rosuvastatin (CRESTOR) 10 MG tablet Take 0.5 tablets by mouth Daily. 45 tablet 3     No facility-administered medications prior to visit.       No opioid medication identified on active medication list. I have reviewed chart for other potential  high risk medication/s and harmful drug interactions in the elderly.          Aspirin is not on active medication list.  Aspirin use is not indicated based on review of current medical condition/s. Risk of harm outweighs potential benefits.  .    Patient Active Problem List   Diagnosis   • Palpitations   • Hyperlipemia, mixed   • History of pituitary adenoma   • Asthma  "exacerbation, mild   • H/O benign breast biopsy   • Osteoporosis without current pathological fracture   • Screening for malignant neoplasm of colon   • Non-seasonal allergic rhinitis due to pollen   • Vitamin D deficiency   • Other microscopic hematuria     Advance Care Planning  Advance Directive is not on file.  ACP discussion was held with the patient during this visit. Patient has an advance directive (not in EMR), copy requested. Patient does not have an advance directive, information provided.       Objective   Vitals:    03/30/23 0910   BP: 122/68   BP Location: Left arm   Patient Position: Sitting   Cuff Size: Adult   Pulse: 83   Resp: 14   Temp: 97.7 °F (36.5 °C)   TempSrc: Skin   SpO2: 100%   Weight: 75.3 kg (166 lb)   Height: 160 cm (63\")     Estimated body mass index is 29.41 kg/m² as calculated from the following:    Height as of this encounter: 160 cm (63\").    Weight as of this encounter: 75.3 kg (166 lb).    BMI is >= 25 and <30. (Overweight) The following options were offered after discussion;: weight loss educational material (shared in after visit summary), exercise counseling/recommendations and nutrition counseling/recommendations      Does the patient have evidence of cognitive impairment?   No    Lab Results   Component Value Date    TRIG 193 (H) 03/21/2023    HDL 42 03/21/2023    LDL 77 03/21/2023    VLDL 32 03/21/2023         ECG 12 Lead    Date/Time: 3/30/2023 10:34 AM  Performed by: Karena Negron MD  Authorized by: Karena Negron MD   Rhythm: sinus rhythm  BPM: 82    Clinical impression: normal ECG               HEALTH RISK ASSESSMENT    Smoking Status:  Social History     Tobacco Use   Smoking Status Never   Smokeless Tobacco Never     Alcohol Consumption:  Social History     Substance and Sexual Activity   Alcohol Use Never    Comment: Current some day, drinks rarely; wine       Fall Risk Screen:    STEADI Fall Risk Assessment was completed, and " patient is at LOW risk for falls.Assessment completed on:3/30/2023    Depression Screen:   PHQ-2/PHQ-9 Depression Screening 3/30/2023   Little Interest or Pleasure in Doing Things 0-->not at all   Feeling Down, Depressed or Hopeless 0-->not at all   Trouble Falling or Staying Asleep, or Sleeping Too Much -   Feeling Tired or Having Little Energy -   PHQ-9: Brief Depression Severity Measure Score 0       Health Habits and Functional and Cognitive Screening:  Functional & Cognitive Status 3/30/2023   Do you have difficulty preparing food and eating? No   Do you have difficulty bathing yourself, getting dressed or grooming yourself? No   Do you have difficulty using the toilet? No   Do you have difficulty moving around from place to place? No   Do you have trouble with steps or getting out of a bed or a chair? No   Current Diet Well Balanced Diet   Dental Exam Up to date   Eye Exam Up to date   Current Exercises Include No Regular Exercise;Walking   Do you need help using the phone?  No   Are you deaf or do you have serious difficulty hearing?  No   Do you need help with transportation? No   Do you need help shopping? No   Do you need help preparing meals?  No   Do you need help with housework?  No   Do you need help with laundry? No   Do you need help taking your medications? No   Do you need help managing money? No   Do you ever drive or ride in a car without wearing a seat belt? No       Visual Acuity:    No results found.    Age-appropriate Screening Schedule:  Refer to the list below for future screening recommendations based on patient's age, sex and/or medical conditions. Orders for these recommended tests are listed in the plan section. The patient has been provided with a written plan.    Health Maintenance   Topic Date Due   • ZOSTER VACCINE (1 of 2) Never done   • COLORECTAL CANCER SCREENING  03/14/2023   • LIPID PANEL  03/21/2024   • ANNUAL WELLNESS VISIT  03/30/2024   • MAMMOGRAM  08/01/2024   • DXA SCAN   02/07/2025   • TDAP/TD VACCINES (2 - Td or Tdap) 09/20/2030   • HEPATITIS C SCREENING  Completed   • COVID-19 Vaccine  Completed   • INFLUENZA VACCINE  Completed   • Pneumococcal Vaccine 65+  Completed        CMS Preventative Services Quick Reference  Risk Factors Identified During Encounter    Immunizations Discussed/Encouraged: Shingrix  The above risks/problems have been discussed with the patient.  Pertinent information has been shared with the patient in the After Visit Summary.    Follow Up:   Initial Medicare Visit in one year    An After Visit Summary and PPPS were made available to the patient.      Additional E&M Note during same encounter follows:  Patient has multiple medical problems which are significant and separately identifiable that require additional work above and beyond the Medicare Wellness Visit.      Chief Complaint  Follow-up (68 yo female here today for her medicare wellness checkup and to also check on her bone density and labs.)  For routine check up  Subjective        HPI  Archana Delvalle is also being seen today for 4 month check up    Saw Dr Gomez-Opt recently -no new dx    Records reviewed, meds reviewed in detail, labs reviewed with patient.  Plan of care is as follows below.      PMH- No chest pain or shortness of breath now.  No more recent palpitations.  She had bilateral cataract extractions done the most recent one was done since her last visit by Dr. Gomez.  The other would on the right with stents in 2019.  She seemed very well.  COVID illness in November 2020 before vaccines.  Up-to-date on COVID vaccines.  History of MVP and occasional palpitations and has seen cardiology periodically for that.  Negative colonoscopy in 2018.  History of mild asthma and allergies.  Had pulmonary functions in 2020 that showed no evidence of rash obstruction.  No response to bronchodilator and no restriction.  Diffusing capacity was normal.  FEV1/FVC was 82%.  FEV1 was 110% predicted.  She is  "to have a repeat colonoscopy in 2023 for follow-up on 1 TA polyp in March 2018.  Perennial allergies to dust and molds.  Previous allergy evaluation.  She has been on Flonase and Zyrtec in the past.  Has ProAir to use as needed.     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   Constitutional:       Appearance: Normal appearance.   HENT:      Head: Normocephalic and atraumatic.   Cardiovascular:      Rate and Rhythm: Normal rate and regular rhythm.   Pulmonary:      Effort: Pulmonary effort is normal.      Breath sounds: Normal breath sounds.   Abdominal:      Palpations: Abdomen is soft.   Musculoskeletal:      Cervical back: Normal range of motion and neck supple.   Neurological:      General: No focal deficit present.      Mental Status: She is alert and oriented to person, place, and time.          The following data was reviewed by: Karena Negron MD on 03/30/2023:  Common labs    Common Labs 5/17/22 5/17/22 5/17/22 11/8/22 11/8/22 11/8/22 3/21/23 3/21/23 3/21/23    0804 0804 0804 0759 0759 0759 0807 0807 0807   Glucose   87  90   90    BUN   11  10   10    Creatinine   0.74  0.73   0.70    Sodium   143  142   139    Potassium   4.1  3.8   4.0    Chloride   105  103   102    Calcium   9.0  9.3   9.4    Albumin   4.50  4.70   4.7    Total Bilirubin   0.4  0.3   0.3    Alkaline Phosphatase   51  52   59    AST (SGOT)   23  25   25    ALT (SGPT)   15  12   15    WBC 4.59   4.71   5.03     Hemoglobin 12.8   13.1   13.1     Hematocrit 38.1   39.6   38.6     Platelets 186   190   192     Total Cholesterol  134    162   151   Triglycerides  " 150    182 (A)   193 (A)   HDL Cholesterol  44    46   42   LDL Cholesterol   64    85   77   (A) Abnormal value                    Assessment and Plan   Diagnoses and all orders for this visit:    1. Encounter for subsequent annual wellness visit (AWV) in Medicare patient (Primary)  -     Comprehensive Metabolic Panel; Future  -     Lipid Panel; Future  -     TSH+Free T4; Future  -     T3, Free; Future  -     Vitamin B12; Future  -     Folate; Future  -     Magnesium; Future  -     Vitamin D,25-Hydroxy; Future  -     CBC & Differential; Future    2. Osteoporosis without current pathological fracture, unspecified osteoporosis type  Comments:  add extra vit D 3 1000 u daily -goal Vit D in 50s-take Caltrate 600 mg /vit D and MVI-Discussed starting Evista-SE discussed-pt to think about it  Orders:  -     Comprehensive Metabolic Panel; Future  -     Lipid Panel; Future  -     TSH+Free T4; Future  -     T3, Free; Future  -     Vitamin B12; Future  -     Folate; Future  -     Magnesium; Future  -     Vitamin D,25-Hydroxy; Future  -     CBC & Differential; Future    3. Pure hypercholesterolemia  -     TSH+Free T4; Future  -     T3, Free; Future    4. Non-seasonal allergic rhinitis due to pollen  -     Comprehensive Metabolic Panel; Future  -     Lipid Panel; Future  -     TSH+Free T4; Future  -     T3, Free; Future  -     Vitamin B12; Future  -     Folate; Future  -     Magnesium; Future  -     Vitamin D,25-Hydroxy; Future  -     CBC & Differential; Future    5. Cystitis with hematuria  Comments:  recheck UA-+blood, +leuk-macrobid for 5 days-recheck UA in 1-2 months -or next visit  Assessment & Plan:  In patient with positive leukocytes we will treat for cystitis patient has some mild left flank discomfort.    Plan use Macrobid 100 mg twice daily for 5 days recheck UA in 2 to 3 weeks.  If still with hematuria will consider urology consult.    Orders:  -     Comprehensive Metabolic Panel; Future  -     Lipid Panel;  Future  -     TSH+Free T4; Future  -     T3, Free; Future  -     Vitamin B12; Future  -     Folate; Future  -     Magnesium; Future  -     Vitamin D,25-Hydroxy; Future  -     CBC & Differential; Future  -     nitrofurantoin, macrocrystal-monohydrate, (Macrobid) 100 MG capsule; Take 1 capsule by mouth 2 (Two) Times a Day.  Dispense: 10 capsule; Refill: 0    6. Screening for malignant neoplasm of colon  Comments:  last one 3/2018-one TA polyp  Orders:  -     Ambulatory Referral For Screening Colonoscopy  -     Comprehensive Metabolic Panel; Future  -     Lipid Panel; Future  -     TSH+Free T4; Future  -     T3, Free; Future  -     Vitamin B12; Future  -     Folate; Future  -     Magnesium; Future  -     Vitamin D,25-Hydroxy; Future  -     CBC & Differential; Future    7. Screening mammogram for breast cancer  Comments:  due 8/2023  Orders:  -     Mammo Screening Bilateral With CAD; Future  -     Comprehensive Metabolic Panel; Future  -     Lipid Panel; Future  -     TSH+Free T4; Future  -     T3, Free; Future  -     Vitamin B12; Future  -     Folate; Future  -     Magnesium; Future  -     Vitamin D,25-Hydroxy; Future  -     CBC & Differential; Future    8. Vitamin D deficiency  Comments:  Continue with multivitamins and vitamin D3 1000 units daily.  Continue with calcium citrate with vitamin D.  Take extra vitamin D1 1000 units daily   Assessment & Plan:  Vitamin D in the low levels of normal.  Goal is in the 50s specially with diagnosis of osteoporosis    Plan add vitamin D3 1000 units daily to her medications.    Orders:  -     Comprehensive Metabolic Panel; Future  -     Lipid Panel; Future  -     TSH+Free T4; Future  -     T3, Free; Future  -     Vitamin B12; Future  -     Folate; Future  -     Magnesium; Future  -     Vitamin D,25-Hydroxy; Future  -     CBC & Differential; Future    9. History of pituitary adenoma  Assessment & Plan:  She had pituitary adenoma resected by Dr. Leahy in [place].  It was picked up  incidentally on an MRI checking for sinusitis by ENT.  Pituitary test were  normal  this year.  Assessment: Status post pituitary adenoma resection.  Clinically stable.  Follows with neurosurgeon as scheduled  Plan: Patient can have pituitary endocrine hormones checked once a year    Orders:  -     Comprehensive Metabolic Panel; Future  -     Lipid Panel; Future  -     TSH+Free T4; Future  -     T3, Free; Future  -     Vitamin B12; Future  -     Folate; Future  -     Magnesium; Future  -     Vitamin D,25-Hydroxy; Future  -     CBC & Differential; Future    10. Obesity (BMI 30-39.9)  -     Comprehensive Metabolic Panel; Future  -     Lipid Panel; Future  -     TSH+Free T4; Future  -     T3, Free; Future  -     Vitamin B12; Future  -     Folate; Future  -     Magnesium; Future  -     Vitamin D,25-Hydroxy; Future  -     CBC & Differential; Future    11. Dysuria  -     POCT urinalysis dipstick, automated  -     Comprehensive Metabolic Panel; Future  -     Lipid Panel; Future  -     TSH+Free T4; Future  -     T3, Free; Future  -     Vitamin B12; Future  -     Folate; Future  -     Magnesium; Future  -     Vitamin D,25-Hydroxy; Future  -     CBC & Differential; Future  -     Urine Culture - Urine, Urine, Clean Catch; Future  -     Urine Culture - Urine, Urine, Clean Catch    12. Hyperlipemia, mixed  Assessment & Plan:  Lipid panel is excellent.  Assessment: Hyperlipidemia on rosuvastatin.  Plan: Rosuvastatin 10 mg at bedtime      13. Other microscopic hematuria  Assessment & Plan:  In patient with positive leukocytes we will treat for cystitis patient has some mild left flank discomfort.    Plan use Macrobid 100 mg twice daily for 5 days recheck UA in 2 to 3 weeks.  If still with hematuria will consider urology consult.      Other orders  -     ECG 12 Lead    Patient Instructions   Patient to think about Evista as treatment for her osteoporosis side effects discussed  Schedule mammogram and colonoscopy due this year  Take  Macrobid 100 mg 1 twice a day for 5 days recheck urine in 3 to 4 weeks.  If still with hematuria needs further evaluation  Start vitamin D3 1000 units daily-continue with multivitamins Caltrate with vitamin D  Recheck labs in 4 months and follow-up for routine checkup then.          Follow Up   Return in about 4 months (around 7/30/2023) for Recheck.  Consider starting Evista discussed use of this medicine with patient and she wants to think about it at present.  Follow-up labs      Patient was given instructions and counseling regarding her condition or for health maintenance advice. Please see specific information pulled into the AVS if appropriate.

## 2023-03-30 NOTE — ASSESSMENT & PLAN NOTE
She had pituitary adenoma resected by Dr. Leahy in [place].  It was picked up incidentally on an MRI checking for sinusitis by ENT.  Pituitary test were  normal  this year.  Assessment: Status post pituitary adenoma resection.  Clinically stable.  Follows with neurosurgeon as scheduled  Plan: Patient can have pituitary endocrine hormones checked once a year

## 2023-03-30 NOTE — ASSESSMENT & PLAN NOTE
Vitamin D in the low levels of normal.  Goal is in the 50s specially with diagnosis of osteoporosis    Plan add vitamin D3 1000 units daily to her medications.

## 2023-03-30 NOTE — PATIENT INSTRUCTIONS
Patient to think about Evista as treatment for her osteoporosis side effects discussed  Schedule mammogram and colonoscopy due this year  Take Macrobid 100 mg 1 twice a day for 5 days recheck urine in 3 to 4 weeks.  If still with hematuria needs further evaluation  Start vitamin D3 1000 units daily-continue with multivitamins Caltrate with vitamin D  Recheck labs in 4 months and follow-up for routine checkup then.

## 2023-03-30 NOTE — ASSESSMENT & PLAN NOTE
In patient with positive leukocytes we will treat for cystitis patient has some mild left flank discomfort.    Plan use Macrobid 100 mg twice daily for 5 days recheck UA in 2 to 3 weeks.  If still with hematuria will consider urology consult.

## 2023-03-31 LAB — BACTERIA SPEC AEROBE CULT: NO GROWTH

## 2023-04-29 ENCOUNTER — TRANSCRIBE ORDERS (OUTPATIENT)
Dept: ADMINISTRATIVE | Facility: HOSPITAL | Age: 68
End: 2023-04-29
Payer: MEDICARE

## 2023-04-29 DIAGNOSIS — Z12.31 VISIT FOR SCREENING MAMMOGRAM: Primary | ICD-10-CM

## 2023-05-10 NOTE — PROGRESS NOTES
CHIEF COMPLAINT  Archana Delvalle presents to [facility] for follow-up of Urinary Tract Infection (67 year old female here today to follow up on UTI. States she completed the ABX. States she did not have and does not have any symptoms. ) and Sinusitis (She complains of sinus pressure X 5 days. Denies any drainage).    HPI  67-year-old patient here for follow-up of UTI status post antibiotics i.e. Macrobid-March 30, 2023     Main complaint is sinus pressure for the past 5 days-no HA-PND-no cough-pressure along her frontal sinuses and upper part of her nose    Cystitis had Macrobid given to her in March 30, 2023 still with 1+ blood in her urine today.-no dysuria -no frequency at present-no gross hematuria    Records reviewed, meds reviewed in detail, labs reviewed with patient.  Plan of care is as follows below.      PMH- No chest pain or shortness of breath now.  No more recent palpitations.  She had bilateral cataract extractions done the most recent one was done since her last visit by Dr. Gomez.  The other would on the right with stents in 2019.  She seemed very well.  COVID illness in November 2020 before vaccines.  Up-to-date on COVID vaccines.  History of MVP and occasional palpitations and has seen cardiology periodically for that.  Negative colonoscopy in 2018.  History of mild asthma and allergies.  Had pulmonary functions in 2020 that showed no evidence of rash obstruction.  No response to bronchodilator and no restriction.  Diffusing capacity was normal.  FEV1/FVC was 82%.  FEV1 was 110% predicted.  She is to have a repeat colonoscopy in 2023 for follow-up on 1 TA polyp in March 2018.  Perennial allergies to dust and molds.  Previous allergy evaluation.  She has been on Flonase and Zyrtec in the past.  Has ProAir to use as needed.     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 GUMMY, Disp: , Rfl:   •  cetirizine (ZyrTEC) 5 MG chewable tablet, Chew 1 tablet Daily., Disp: , Rfl:   •  cholecalciferol (VITAMIN D3) 25 MCG (1000 UT) tablet, Take 1 tablet by mouth Daily., Disp: , Rfl:   •  metoprolol succinate XL (TOPROL-XL) 25 MG 24 hr tablet, Take 0.5 tablets by mouth Daily., Disp: 45 tablet, Rfl: 0  •  multivitamin with minerals tablet tablet, Take 1 tablet by mouth Daily., Disp: , Rfl:   •  rosuvastatin (CRESTOR) 10 MG tablet, Take 0.5 tablets by mouth Daily., Disp: 45 tablet, Rfl: 3  •  amoxicillin (AMOXIL) 875 MG tablet, One tab po BID for 7 days, Disp: 14 tablet, Rfl: 0   PFSH reviewed.      OBJECTIVE  Vital Signs  Vitals:    05/11/23 0841   BP: 116/70   BP Location: Left arm   Patient Position: Sitting   Pulse: 76   Resp: 18   Temp: 98.3 °F (36.8 °C)   TempSrc: Temporal   SpO2: 97%   Weight: 75.8 kg (167 lb)   Height: 160 cm (63\")      Body mass index is 29.58 kg/m².    Physical Exam  Vitals and nursing note reviewed.   Constitutional:       Appearance: Normal appearance.   HENT:      Head: Normocephalic and atraumatic.      Right Ear: Tympanic membrane normal.      Left Ear: Tympanic membrane normal.      Nose: Rhinorrhea present.      Mouth/Throat:      Pharynx: No posterior oropharyngeal erythema.   Eyes:      Comments: Tender frontal sinuses no maxillary sinus tenderness   Cardiovascular:      Rate and Rhythm: Normal rate and regular rhythm.   Pulmonary:      Effort: Pulmonary effort is normal.      Breath sounds: Normal breath sounds.   Abdominal:      Palpations: Abdomen is soft.   Musculoskeletal:      Cervical back: Normal range of motion and neck supple.   Neurological:      General: No focal deficit present.      Mental Status: She is alert and oriented to person, place, and time.          RESULTS REVIEW  No " results found for: PROBNP, BNP  CMP        5/17/2022    08:04 11/8/2022    07:59 3/21/2023    08:07   CMP   Glucose 87   90   90     BUN 11   10   10     Creatinine 0.74   0.73   0.70     EGFR 90.5   91.4   96.1     Sodium 143   142   139     Potassium 4.1   3.8   4.0     Chloride 105   103   102     Calcium 9.0   9.3   9.4     Total Protein 6.8   7.3   7.3     Albumin 4.50   4.70   4.7     Globulin 2.3   2.6   2.6     Total Bilirubin 0.4   0.3   0.3     Alkaline Phosphatase 51   52   59     AST (SGOT) 23   25   25     ALT (SGPT) 15   12   15     Albumin/Globulin Ratio 2.0   1.8   1.8     BUN/Creatinine Ratio 14.9   13.7   14.3     Anion Gap 13.1   10.8   11.6       CBC w/diff        5/17/2022    08:04 11/8/2022    07:59 3/21/2023    08:07   CBC w/Diff   WBC 4.59   4.71   5.03     RBC 3.93   4.09   4.07     Hemoglobin 12.8   13.1   13.1     Hematocrit 38.1   39.6   38.6     MCV 96.9   96.8   94.8     MCH 32.6   32.0   32.2     MCHC 33.6   33.1   33.9     RDW 12.3   12.0   12.1     Platelets 186   190   192     Neutrophil Rel % 44.4   53.8   51.1     Immature Granulocyte Rel % 0.2   0.2      Lymphocyte Rel % 38.6   33.5   35.6     Monocyte Rel % 13.7   10.0   10.1     Eosinophil Rel % 2.0   1.7   2.2     Basophil Rel % 1.1   0.8   0.8        Lipid Panel        5/17/2022    08:04 11/8/2022    07:59 3/21/2023    08:07   Lipid Panel   Total Cholesterol 134   162   151     Triglycerides 150   182   193     HDL Cholesterol 44   46   42     VLDL Cholesterol 26   31   32     LDL Cholesterol  64   85   77     LDL/HDL Ratio 1.36   1.73   1.68        Lab Results   Component Value Date    TSH 2.750 03/21/2023    TSH 3.540 11/08/2022    TSH 2.280 05/17/2022      Lab Results   Component Value Date    FREET4 1.03 03/21/2023    FREET4 0.98 11/08/2022    FREET4 1.04 01/17/2022         Lab Results   Component Value Date    MVZTPGKB68 609 03/21/2023    IKRE39IK 37.0 03/21/2023    MG 2.2 03/21/2023        No Images in the past 120 days  found..             ASSESSMENT & PLAN  Diagnoses and all orders for this visit:    1. Other microscopic hematuria (Primary)  Comments:  still with microscopic hematuria-refer to Urology for further eval-UA today with 1+ blood no leukocytes no nitrites 25 erythrocytes per uL  Orders:  -     POCT urinalysis dipstick, automated  -     Ambulatory Referral to Urology  -     Urinalysis With Microscopic - Urine, Clean Catch; Future    2. Acute frontal sinusitis, recurrence not specified  Comments:  Treat with amoxicillin 875 mg 1 tablet twice a day for 7 days.  Orders:  -     amoxicillin (AMOXIL) 875 MG tablet; One tab po BID for 7 days  Dispense: 14 tablet; Refill: 0      Recheck urine at next visit to monitor the microscopic hematuria.            FOLLOW UP  Return for Next scheduled follow up, Recheck.    Patient was given instructions and counseling regarding her condition or for health maintenance advice. Please see specific information pulled into the AVS if appropriate.

## 2023-05-11 ENCOUNTER — OFFICE VISIT (OUTPATIENT)
Dept: INTERNAL MEDICINE | Facility: CLINIC | Age: 68
End: 2023-05-11
Payer: MEDICARE

## 2023-05-11 VITALS
WEIGHT: 167 LBS | TEMPERATURE: 98.3 F | OXYGEN SATURATION: 97 % | BODY MASS INDEX: 29.59 KG/M2 | HEIGHT: 63 IN | DIASTOLIC BLOOD PRESSURE: 70 MMHG | RESPIRATION RATE: 18 BRPM | SYSTOLIC BLOOD PRESSURE: 116 MMHG | HEART RATE: 76 BPM

## 2023-05-11 DIAGNOSIS — R31.29 OTHER MICROSCOPIC HEMATURIA: Primary | ICD-10-CM

## 2023-05-11 DIAGNOSIS — J01.10 ACUTE FRONTAL SINUSITIS, RECURRENCE NOT SPECIFIED: ICD-10-CM

## 2023-05-11 LAB
BILIRUB BLD-MCNC: NEGATIVE MG/DL
CLARITY, POC: ABNORMAL
COLOR UR: YELLOW
EXPIRATION DATE: ABNORMAL
GLUCOSE UR STRIP-MCNC: NEGATIVE MG/DL
KETONES UR QL: NEGATIVE
LEUKOCYTE EST, POC: NEGATIVE
Lab: ABNORMAL
NITRITE UR-MCNC: NEGATIVE MG/ML
PH UR: 6 [PH] (ref 5–8)
PROT UR STRIP-MCNC: NEGATIVE MG/DL
RBC # UR STRIP: ABNORMAL /UL
SP GR UR: 1.02 (ref 1–1.03)
UROBILINOGEN UR QL: ABNORMAL

## 2023-05-11 PROCEDURE — 1160F RVW MEDS BY RX/DR IN RCRD: CPT | Performed by: INTERNAL MEDICINE

## 2023-05-11 PROCEDURE — 99213 OFFICE O/P EST LOW 20 MIN: CPT | Performed by: INTERNAL MEDICINE

## 2023-05-11 PROCEDURE — 1159F MED LIST DOCD IN RCRD: CPT | Performed by: INTERNAL MEDICINE

## 2023-05-11 PROCEDURE — 81003 URINALYSIS AUTO W/O SCOPE: CPT | Performed by: INTERNAL MEDICINE

## 2023-05-11 RX ORDER — AMOXICILLIN 875 MG/1
TABLET, COATED ORAL
Qty: 14 TABLET | Refills: 0 | Status: SHIPPED | OUTPATIENT
Start: 2023-05-11

## 2023-05-25 ENCOUNTER — PREP FOR SURGERY (OUTPATIENT)
Dept: OTHER | Facility: HOSPITAL | Age: 68
End: 2023-05-25
Payer: MEDICARE

## 2023-05-25 ENCOUNTER — CLINICAL SUPPORT (OUTPATIENT)
Dept: GASTROENTEROLOGY | Facility: CLINIC | Age: 68
End: 2023-05-25
Payer: MEDICARE

## 2023-05-25 DIAGNOSIS — Z86.010 HISTORY OF COLON POLYPS: ICD-10-CM

## 2023-05-25 DIAGNOSIS — Z12.11 ENCOUNTER FOR SCREENING FOR MALIGNANT NEOPLASM OF COLON: Primary | ICD-10-CM

## 2023-05-25 PROBLEM — Z86.0100 HISTORY OF COLON POLYPS: Status: ACTIVE | Noted: 2023-05-25

## 2023-05-25 NOTE — PROGRESS NOTES
Archana Delvalle  1955  67 y.o.    Reason for call: Recall- 5 yr colon, hx colon polyps, last colon -  Prep prescribed: Clenpiq  Prep instructions reviewed with patient and sent to patient via [employer]  Clearance needed? Yes  If yes, what clearance is needed? Cardiology  Clearance has been requested from - Tatyana  The patient has been scheduled for: Colonoscopy  Family history of colon cancer? No  If yes, indicate relative: N/A  Family History   Problem Relation Age of Onset   • Arrhythmia Father    • Diabetes Father         Unspecified type   • Prostate cancer Father    • Colon cancer Neg Hx      Past Medical History:   Diagnosis Date   • Asthma    • High cholesterol    • Tachycardia      Allergies   Allergen Reactions   • Codeine Rash   • Tetracycline Rash   • Levaquin [Levofloxacin] Diarrhea   • Singulair [Montelukast Sodium] Nausea Only     Past Surgical History:   Procedure Laterality Date   • BREAST BIOPSY     •  SECTION     • COLONOSCOPY      2009   • ENDOMETRIAL ABLATION     • ENDOSCOPY     • EYE SURGERY Left 2022    Dr. Kael Gomez   • OTHER SURGICAL HISTORY      Incision and Draining of Abcess     Social History     Socioeconomic History   • Marital status: Single   Tobacco Use   • Smoking status: Never     Passive exposure: Never   • Smokeless tobacco: Never   Vaping Use   • Vaping Use: Never used   Substance and Sexual Activity   • Alcohol use: Never     Comment: Current some day, drinks rarely; wine   • Drug use: Never   • Sexual activity: Defer       Current Outpatient Medications:   •  albuterol sulfate  (90 Base) MCG/ACT inhaler, INHALE 2 PUFFS EVERY 4 HOURS AS NEEDED, Disp: 8.5 g, Rfl: 1  •  BIOTIN PO, Take  by mouth. GUMMY, Disp: , Rfl:   •  cetirizine (ZyrTEC) 5 MG chewable tablet, Chew 1 tablet Daily., Disp: , Rfl:   •  cholecalciferol (VITAMIN D3) 25 MCG (1000 UT) tablet, Take 1 tablet by mouth Daily., Disp: , Rfl:   •  metoprolol succinate XL (TOPROL-XL) 25  MG 24 hr tablet, Take 0.5 tablets by mouth Daily., Disp: 45 tablet, Rfl: 0  •  multivitamin with minerals tablet tablet, Take 1 tablet by mouth Daily., Disp: , Rfl:   •  rosuvastatin (CRESTOR) 10 MG tablet, Take 0.5 tablets by mouth Daily., Disp: 45 tablet, Rfl: 3  •  amoxicillin (AMOXIL) 875 MG tablet, One tab po BID for 7 days (Patient not taking: Reported on 5/25/2023), Disp: 14 tablet, Rfl: 0

## 2023-06-05 ENCOUNTER — TELEPHONE (OUTPATIENT)
Dept: GASTROENTEROLOGY | Facility: CLINIC | Age: 68
End: 2023-06-05
Payer: MEDICARE

## 2023-06-05 RX ORDER — METOPROLOL SUCCINATE 25 MG/1
TABLET, EXTENDED RELEASE ORAL
Qty: 45 TABLET | Refills: 3 | Status: SHIPPED | OUTPATIENT
Start: 2023-06-05

## 2023-06-05 NOTE — TELEPHONE ENCOUNTER
6/5/2023    Dear Dr. Joe,     Patient: Archana Delvalle   YOB: 1955        This patient is waiting to have a Colonoscopy and/or Esophagogastroduodenoscopy which I will perform at [facility] on 08/08/2023.     Please respond to this request noting your recommendations. You may contact our office at [phone] Option 1 with any questions. I appreciate your prompt response in this matter.     Please return this form to our office no later than two weeks prior to the procedure date listed above. Please return form to [phone].       ____ I approve my patient from a cardiac standpoint.    ____ I do NOT approve my patient from a cardiac standpoint at this time.    Please specify clearance expiration date:_____________________________    Approving physician name (please print):     _____________________________________________    Approving physician signature:     ________________________________    Date:________________        Sincerely,  [facility]   Gastroenterology - Dr.Kevin Cota

## 2023-06-08 NOTE — TELEPHONE ENCOUNTER
Procedure: Colonoscopy and/or EGD     Med Directive: NA     PMH: palpitations, HLD     Last Seen: 1/10/23

## 2023-07-26 ENCOUNTER — HOSPITAL ENCOUNTER (OUTPATIENT)
Dept: CT IMAGING | Facility: HOSPITAL | Age: 68
Discharge: HOME | End: 2023-07-26
Admitting: NURSE PRACTITIONER
Payer: MEDICARE

## 2023-07-26 DIAGNOSIS — R31.29 MICROSCOPIC HEMATURIA: ICD-10-CM

## 2023-07-26 PROCEDURE — 25510000001 IOPAMIDOL PER 1 ML: Performed by: NURSE PRACTITIONER

## 2023-07-26 PROCEDURE — 74178 CT ABD&PLV WO CNTR FLWD CNTR: CPT

## 2023-07-26 RX ADMIN — IOPAMIDOL 100 ML: 755 INJECTION, SOLUTION INTRAVENOUS at 12:33

## 2023-07-27 ENCOUNTER — TELEPHONE (OUTPATIENT)
Dept: UROLOGY | Facility: CLINIC | Age: 68
End: 2023-07-27
Payer: MEDICARE

## 2023-07-27 NOTE — TELEPHONE ENCOUNTER
PATIENT CALLED AND SAID SHE HAD A CT 07/26/23.  SHE ASKED FOR THE RESULTS.  SHE SAID SHE DOESN'T HAVE AN APPOINTMENT UNTIL AUGUST.    I TOLD HER THAT [facility] IS OUT OF OFFICE THIS WEEK.    SHE ASKED FOR THE NURSE TO CALL HER.

## 2023-07-31 NOTE — PRE-PROCEDURE INSTRUCTIONS
"Instructed on date and arrival time of 0800. Come to entrance \"C\". Must have  over age 18 to drive home.  May have two visitors; however, children under 12 must stay in waiting room.  Discussed clear liquid diet (no red or purple) and bowel prep.  May take medications as usual except for blood thinners, diabetic medications, and weight loss medications.  Bring list of medications.  Verbalized understanding of instructions given.  Phone number given for questions or concerns.  Cardiac clearance noted in chart.  "

## 2023-08-03 ENCOUNTER — HOSPITAL ENCOUNTER (OUTPATIENT)
Dept: MAMMOGRAPHY | Facility: HOSPITAL | Age: 68
Discharge: HOME | End: 2023-08-03
Admitting: INTERNAL MEDICINE
Payer: MEDICARE

## 2023-08-03 DIAGNOSIS — Z12.31 VISIT FOR SCREENING MAMMOGRAM: ICD-10-CM

## 2023-08-03 PROCEDURE — 77067 SCR MAMMO BI INCL CAD: CPT

## 2023-08-03 PROCEDURE — 77063 BREAST TOMOSYNTHESIS BI: CPT

## 2023-08-08 ENCOUNTER — ANESTHESIA EVENT (OUTPATIENT)
Dept: GASTROENTEROLOGY | Facility: HOSPITAL | Age: 68
End: 2023-08-08
Payer: MEDICARE

## 2023-08-08 ENCOUNTER — HOSPITAL ENCOUNTER (OUTPATIENT)
Facility: HOSPITAL | Age: 68
Setting detail: HOSPITAL OUTPATIENT SURGERY
Discharge: HOME | End: 2023-08-08
Attending: INTERNAL MEDICINE | Admitting: INTERNAL MEDICINE
Payer: MEDICARE

## 2023-08-08 ENCOUNTER — ANESTHESIA (OUTPATIENT)
Dept: GASTROENTEROLOGY | Facility: HOSPITAL | Age: 68
End: 2023-08-08
Payer: MEDICARE

## 2023-08-08 VITALS
RESPIRATION RATE: 17 BRPM | TEMPERATURE: 97 F | DIASTOLIC BLOOD PRESSURE: 66 MMHG | SYSTOLIC BLOOD PRESSURE: 102 MMHG | BODY MASS INDEX: 28.91 KG/M2 | HEART RATE: 79 BPM | OXYGEN SATURATION: 99 % | WEIGHT: 163.14 LBS

## 2023-08-08 DIAGNOSIS — Z86.010 HISTORY OF COLON POLYPS: ICD-10-CM

## 2023-08-08 DIAGNOSIS — Z12.11 ENCOUNTER FOR SCREENING FOR MALIGNANT NEOPLASM OF COLON: ICD-10-CM

## 2023-08-08 PROCEDURE — 25010000002 PROPOFOL 10 MG/ML EMULSION: Performed by: NURSE ANESTHETIST, CERTIFIED REGISTERED

## 2023-08-08 RX ORDER — LIDOCAINE HYDROCHLORIDE 20 MG/ML
INJECTION, SOLUTION EPIDURAL; INFILTRATION; INTRACAUDAL; PERINEURAL AS NEEDED
Status: DISCONTINUED | OUTPATIENT
Start: 2023-08-08 | End: 2023-08-08 | Stop reason: SURG

## 2023-08-08 RX ORDER — PROPOFOL 10 MG/ML
VIAL (ML) INTRAVENOUS AS NEEDED
Status: DISCONTINUED | OUTPATIENT
Start: 2023-08-08 | End: 2023-08-08 | Stop reason: SURG

## 2023-08-08 RX ORDER — SODIUM CHLORIDE, SODIUM LACTATE, POTASSIUM CHLORIDE, CALCIUM CHLORIDE 600; 310; 30; 20 MG/100ML; MG/100ML; MG/100ML; MG/100ML
30 INJECTION, SOLUTION INTRAVENOUS CONTINUOUS
Status: DISCONTINUED | OUTPATIENT
Start: 2023-08-08 | End: 2023-08-08 | Stop reason: HOSPADM

## 2023-08-08 RX ADMIN — SODIUM CHLORIDE, POTASSIUM CHLORIDE, SODIUM LACTATE AND CALCIUM CHLORIDE 30 ML/HR: 600; 310; 30; 20 INJECTION, SOLUTION INTRAVENOUS at 09:10

## 2023-08-08 RX ADMIN — PROPOFOL 175 MCG/KG/MIN: 10 INJECTION, EMULSION INTRAVENOUS at 09:53

## 2023-08-08 RX ADMIN — PROPOFOL 100 MG: 10 INJECTION, EMULSION INTRAVENOUS at 09:53

## 2023-08-08 RX ADMIN — LIDOCAINE HYDROCHLORIDE 50 MG: 20 INJECTION, SOLUTION EPIDURAL; INFILTRATION; INTRACAUDAL; PERINEURAL at 09:53

## 2023-08-08 NOTE — H&P
ScreeningPre Procedure History & Physical    Chief Complaint:   Screening     Subjective     HPI:   Screening     Past Medical History:   Past Medical History:   Diagnosis Date    Asthma     High cholesterol     Tachycardia        Past Surgical History:  Past Surgical History:   Procedure Laterality Date    BREAST BIOPSY       SECTION      COLONOSCOPY      2009    ENDOMETRIAL ABLATION      ENDOSCOPY      EYE SURGERY Left 2022    Dr. Kael Gomez    OTHER SURGICAL HISTORY      Incision and Draining of Abcess    PITUITARY SURGERY         Family History:  Family History   Problem Relation Age of Onset    Arrhythmia Father     Diabetes Father         Unspecified type    Prostate cancer Father     Colon cancer Neg Hx        Social History:   reports that she has never smoked. She has never been exposed to tobacco smoke. She has never used smokeless tobacco. She reports that she does not drink alcohol and does not use drugs.    Medications:   Medications Prior to Admission   Medication Sig Dispense Refill Last Dose    calcium carbonate (OS-JOSIE) 600 MG tablet Take 1 tablet by mouth Daily.   Past Week    rosuvastatin (CRESTOR) 10 MG tablet Take 0.5 tablets by mouth Daily. 45 tablet 3 2023    albuterol sulfate  (90 Base) MCG/ACT inhaler INHALE 2 PUFFS EVERY 4 HOURS AS NEEDED (Patient not taking: Reported on 2023) 8.5 g 1 More than a month    cetirizine (ZyrTEC) 5 MG chewable tablet Chew 1 tablet Daily.   More than a month    cholecalciferol (VITAMIN D3) 25 MCG (1000 UT) tablet Take 1 tablet by mouth Daily.   2023    metoprolol succinate XL (TOPROL-XL) 25 MG 24 hr tablet TAKE 1/2 TABLET BY MOUTH EVERY DAY 45 tablet 3 2023    multivitamin with minerals tablet tablet Take 1 tablet by mouth Daily.   2023       Allergies:  Codeine, Tetracycline, Levaquin [levofloxacin], and Singulair [montelukast sodium]        Objective     Blood pressure 108/63, pulse 74, temperature 97.5 øF  (36.4 øC), temperature source Temporal, resp. rate 16, weight 74 kg (163 lb 2.3 oz), SpO2 97 %, not currently breastfeeding.    Physical Exam   Constitutional: Pt is oriented to person, place, and time and well-developed, well-nourished, and in no distress.   Mouth/Throat: Oropharynx is clear and moist.   Neck: Normal range of motion.   Cardiovascular: Normal rate, regular rhythm and normal heart sounds.    Pulmonary/Chest: Effort normal and breath sounds normal.   Abdominal: Soft. Nontender  Skin: Skin is warm and dry.   Psychiatric: Mood, memory, affect and judgment normal.     Assessment & Plan     Diagnosis:  Screening colonoscopy  H/o colon polyps     Anticipated Surgical Procedure:  colonoscopy    The risks, benefits, and alternatives of this procedure have been discussed with the patient or the responsible party- the patient understands and agrees to proceed.

## 2023-08-08 NOTE — ANESTHESIA POSTPROCEDURE EVALUATION
Patient: Archana Delvalle    Procedure Summary       Date: 08/08/23 Room / Location: [facility] ENDOSCOPY 2 / [facility] ENDOSCOPY    Anesthesia Start: 0950 Anesthesia Stop: 1021    Procedure: COLONOSCOPY WITH COLD SNARE POLYPECTOMY Diagnosis:       Encounter for screening for malignant neoplasm of colon      History of colon polyps      (Encounter for screening for malignant neoplasm of colon [Z12.11])      (History of colon polyps [Z86.010])    Surgeons: Mario Cota MD Provider: Reyes, Mirabelle, DO    Anesthesia Type: general ASA Status: 2            Anesthesia Type: general    Vitals  Vitals Value Taken Time   /66 08/08/23 1040   Temp 36.1 øC (97 øF) 08/08/23 1040   Pulse 79 08/08/23 1040   Resp 17 08/08/23 1040   SpO2 99 % 08/08/23 1040           Post Anesthesia Care and Evaluation    Patient location during evaluation: bedside  Patient participation: complete - patient participated  Level of consciousness: awake  Pain management: adequate    Airway patency: patent  PONV Status: none  Cardiovascular status: acceptable and stable  Respiratory status: acceptable  Hydration status: acceptable    Comments: An Anesthesiologist personally participated in the most demanding procedures (including induction and emergence if applicable) in the anesthesia plan, monitored the course of anesthesia administration at frequent intervals and remained physically present and available for immediate diagnosis and treatment of emergencies.

## 2023-08-08 NOTE — ANESTHESIA PREPROCEDURE EVALUATION
Anesthesia Evaluation     Patient summary reviewed and Nursing notes reviewed   no history of anesthetic complications:   NPO Solid Status: > 8 hours  NPO Liquid Status: > 2 hours           Airway   Mallampati: II  TM distance: <3 FB  Neck ROM: full  Possible difficult intubation  Dental - normal exam     Pulmonary - normal exam    breath sounds clear to auscultation  (+) asthma (mild),  Cardiovascular - normal exam  Exercise tolerance: good (4-7 METS)    Rhythm: regular  Rate: normal    (+) hyperlipidemia      Neuro/Psych- negative ROS  GI/Hepatic/Renal/Endo    (+) renal disease (trace hematuria)    Musculoskeletal (-) negative ROS    Abdominal    Substance History - negative use     OB/GYN negative ob/gyn ROS         Other - negative ROS       ROS/Med Hx Other: PAT Nursing Notes unavailable.                 Anesthesia Plan    ASA 2     general     (Patient understands anesthesia not responsible for dental damage.)  intravenous induction     Anesthetic plan, risks, benefits, and alternatives have been provided, discussed and informed consent has been obtained with: patient.  Pre-procedure education provided  Use of blood products discussed with patient  Consented to blood products.    Plan discussed with CRNA.    CODE STATUS:

## 2023-08-15 ENCOUNTER — TELEPHONE (OUTPATIENT)
Dept: GASTROENTEROLOGY | Facility: CLINIC | Age: 68
End: 2023-08-15
Payer: MEDICARE

## 2023-08-15 NOTE — TELEPHONE ENCOUNTER
I spoke with Ms Delvalle.  She stated she had her colonoscopy 1 week ago (08/08/23).  She stated she resumed her normal diet the next day.  She stated she has had 1 BM on Friday..  she is concerned about having an obstruction.     I asked pt if she is having any abd pain and able to pass gas.  Pt stated no pain and yes to passing gas..      I explained to pt that her whole colon was cleaned out during the bowel prep and it can take some time to get back into her normal bowel habits..  I said she could try OTC miralax daily. She stated she also wanted to try otc suppository too.  I stated to try miralax for a few days, of not BM to call office.  Voiced understanding. moshe

## 2023-08-28 ENCOUNTER — PROCEDURE VISIT (OUTPATIENT)
Dept: UROLOGY | Facility: CLINIC | Age: 68
End: 2023-08-28
Payer: MEDICARE

## 2023-08-28 VITALS — HEIGHT: 63 IN | WEIGHT: 169 LBS | BODY MASS INDEX: 29.95 KG/M2

## 2023-08-28 DIAGNOSIS — R31.29 MICROSCOPIC HEMATURIA: Primary | ICD-10-CM

## 2023-08-28 LAB
BILIRUB BLD-MCNC: NEGATIVE MG/DL
CLARITY, POC: CLEAR
COLOR UR: YELLOW
EXPIRATION DATE: ABNORMAL
GLUCOSE UR STRIP-MCNC: NEGATIVE MG/DL
KETONES UR QL: NEGATIVE
LEUKOCYTE EST, POC: NEGATIVE
Lab: ABNORMAL
NITRITE UR-MCNC: NEGATIVE MG/ML
PH UR: 5.5 [PH] (ref 5–8)
PROT UR STRIP-MCNC: NEGATIVE MG/DL
RBC # UR STRIP: ABNORMAL /UL
SP GR UR: 1.01 (ref 1–1.03)
UROBILINOGEN UR QL: NORMAL

## 2023-08-28 NOTE — PROGRESS NOTES
Cystoscopy    Date/Time: 8/28/2023 2:49 PM  Performed by: Evette Duggan MD  Authorized by: Opal Villa APRN   Preparation: Patient was prepped and draped in the usual sterile fashion.  Local anesthesia used: no    Anesthesia:  Local anesthesia used: no    Sedation:  Patient sedated: no    Patient tolerance: patient tolerated the procedure well with no immediate complications  Comments: Cytoscopy Procedure:     Procedure: Flexible cytoscope was passed per urethra into the bladder without difficulty after proper consent. The bladder was inspected in a systematic meridian fashion. There were no tumors, lesions, stones, or other abnormalities noted within the bladder. Of note, there was no increased vascularity as well. Both ureteral orifices were identified and were normal in appearance. The flexible cytoscope was removed. The patient tolerated the procedure well.

## 2023-11-09 ENCOUNTER — LAB (OUTPATIENT)
Dept: LAB | Facility: HOSPITAL | Age: 68
End: 2023-11-09
Payer: MEDICARE

## 2023-11-09 DIAGNOSIS — E53.8 VITAMIN B12 DEFICIENCY: ICD-10-CM

## 2023-11-09 DIAGNOSIS — Z86.018 HISTORY OF PITUITARY ADENOMA: ICD-10-CM

## 2023-11-09 DIAGNOSIS — M81.0 AGE-RELATED OSTEOPOROSIS WITHOUT CURRENT PATHOLOGICAL FRACTURE: ICD-10-CM

## 2023-11-09 DIAGNOSIS — E55.9 VITAMIN D DEFICIENCY: ICD-10-CM

## 2023-11-09 DIAGNOSIS — E78.2 HYPERLIPEMIA, MIXED: ICD-10-CM

## 2023-11-09 LAB
25(OH)D3 SERPL-MCNC: 38.1 NG/ML (ref 30–100)
ALBUMIN SERPL-MCNC: 4.7 G/DL (ref 3.5–5.2)
ALBUMIN/GLOB SERPL: 2 G/DL
ALP SERPL-CCNC: 49 U/L (ref 39–117)
ALT SERPL W P-5'-P-CCNC: 17 U/L (ref 1–33)
ANION GAP SERPL CALCULATED.3IONS-SCNC: 7 MMOL/L (ref 5–15)
AST SERPL-CCNC: 22 U/L (ref 1–32)
BASOPHILS # BLD AUTO: 0.04 10*3/MM3 (ref 0–0.2)
BASOPHILS NFR BLD AUTO: 0.8 % (ref 0–1.5)
BILIRUB SERPL-MCNC: 0.4 MG/DL (ref 0–1.2)
BUN SERPL-MCNC: 13 MG/DL (ref 8–23)
BUN/CREAT SERPL: 16 (ref 7–25)
CALCIUM SPEC-SCNC: 9.5 MG/DL (ref 8.6–10.5)
CHLORIDE SERPL-SCNC: 103 MMOL/L (ref 98–107)
CHOLEST SERPL-MCNC: 145 MG/DL (ref 0–200)
CO2 SERPL-SCNC: 30 MMOL/L (ref 22–29)
CREAT SERPL-MCNC: 0.81 MG/DL (ref 0.57–1)
DEPRECATED RDW RBC AUTO: 43 FL (ref 37–54)
EGFRCR SERPLBLD CKD-EPI 2021: 80.2 ML/MIN/1.73
EOSINOPHIL # BLD AUTO: 0.09 10*3/MM3 (ref 0–0.4)
EOSINOPHIL NFR BLD AUTO: 1.8 % (ref 0.3–6.2)
ERYTHROCYTE [DISTWIDTH] IN BLOOD BY AUTOMATED COUNT: 12.1 % (ref 12.3–15.4)
FOLATE SERPL-MCNC: >20 NG/ML (ref 4.78–24.2)
GLOBULIN UR ELPH-MCNC: 2.3 GM/DL
GLUCOSE SERPL-MCNC: 87 MG/DL (ref 65–99)
HCT VFR BLD AUTO: 38.9 % (ref 34–46.6)
HDLC SERPL-MCNC: 43 MG/DL (ref 40–60)
HGB BLD-MCNC: 13.1 G/DL (ref 12–15.9)
IMM GRANULOCYTES # BLD AUTO: 0.01 10*3/MM3 (ref 0–0.05)
IMM GRANULOCYTES NFR BLD AUTO: 0.2 % (ref 0–0.5)
LDLC SERPL CALC-MCNC: 64 MG/DL (ref 0–100)
LDLC/HDLC SERPL: 1.27 {RATIO}
LYMPHOCYTES # BLD AUTO: 1.91 10*3/MM3 (ref 0.7–3.1)
LYMPHOCYTES NFR BLD AUTO: 38.8 % (ref 19.6–45.3)
MAGNESIUM SERPL-MCNC: 2.2 MG/DL (ref 1.6–2.4)
MCH RBC QN AUTO: 32.6 PG (ref 26.6–33)
MCHC RBC AUTO-ENTMCNC: 33.7 G/DL (ref 31.5–35.7)
MCV RBC AUTO: 96.8 FL (ref 79–97)
MONOCYTES # BLD AUTO: 0.51 10*3/MM3 (ref 0.1–0.9)
MONOCYTES NFR BLD AUTO: 10.4 % (ref 5–12)
NEUTROPHILS NFR BLD AUTO: 2.36 10*3/MM3 (ref 1.7–7)
NEUTROPHILS NFR BLD AUTO: 48 % (ref 42.7–76)
NRBC BLD AUTO-RTO: 0 /100 WBC (ref 0–0.2)
PLATELET # BLD AUTO: 190 10*3/MM3 (ref 140–450)
PMV BLD AUTO: 12.7 FL (ref 6–12)
POTASSIUM SERPL-SCNC: 4.1 MMOL/L (ref 3.5–5.2)
PROT SERPL-MCNC: 7 G/DL (ref 6–8.5)
RBC # BLD AUTO: 4.02 10*6/MM3 (ref 3.77–5.28)
SODIUM SERPL-SCNC: 140 MMOL/L (ref 136–145)
T3FREE SERPL-MCNC: 2.77 PG/ML (ref 2–4.4)
T4 FREE SERPL-MCNC: 0.95 NG/DL (ref 0.93–1.7)
TRIGL SERPL-MCNC: 237 MG/DL (ref 0–150)
TSH SERPL DL<=0.05 MIU/L-ACNC: 2.41 UIU/ML (ref 0.27–4.2)
VIT B12 BLD-MCNC: 775 PG/ML (ref 211–946)
VLDLC SERPL-MCNC: 38 MG/DL (ref 5–40)
WBC NRBC COR # BLD: 4.92 10*3/MM3 (ref 3.4–10.8)

## 2023-11-09 PROCEDURE — 82746 ASSAY OF FOLIC ACID SERUM: CPT

## 2023-11-09 PROCEDURE — 84481 FREE ASSAY (FT-3): CPT

## 2023-11-09 PROCEDURE — 82306 VITAMIN D 25 HYDROXY: CPT

## 2023-11-09 PROCEDURE — 82607 VITAMIN B-12: CPT

## 2023-11-09 PROCEDURE — 85025 COMPLETE CBC W/AUTO DIFF WBC: CPT

## 2023-11-09 PROCEDURE — 84439 ASSAY OF FREE THYROXINE: CPT

## 2023-11-09 PROCEDURE — 84443 ASSAY THYROID STIM HORMONE: CPT

## 2023-11-09 PROCEDURE — 83735 ASSAY OF MAGNESIUM: CPT

## 2023-11-09 PROCEDURE — 80053 COMPREHEN METABOLIC PANEL: CPT

## 2023-11-09 PROCEDURE — 36415 COLL VENOUS BLD VENIPUNCTURE: CPT

## 2023-11-09 PROCEDURE — 80061 LIPID PANEL: CPT

## 2023-11-16 ENCOUNTER — OFFICE VISIT (OUTPATIENT)
Dept: INTERNAL MEDICINE | Facility: CLINIC | Age: 68
End: 2023-11-16
Payer: MEDICARE

## 2023-11-16 VITALS
SYSTOLIC BLOOD PRESSURE: 125 MMHG | OXYGEN SATURATION: 99 % | DIASTOLIC BLOOD PRESSURE: 73 MMHG | TEMPERATURE: 97.8 F | WEIGHT: 167.2 LBS | HEIGHT: 63 IN | BODY MASS INDEX: 29.62 KG/M2 | HEART RATE: 90 BPM

## 2023-11-16 DIAGNOSIS — J30.1 NON-SEASONAL ALLERGIC RHINITIS DUE TO POLLEN: ICD-10-CM

## 2023-11-16 DIAGNOSIS — H65.03 NON-RECURRENT ACUTE SEROUS OTITIS MEDIA OF BOTH EARS: ICD-10-CM

## 2023-11-16 DIAGNOSIS — E78.2 HYPERLIPEMIA, MIXED: ICD-10-CM

## 2023-11-16 DIAGNOSIS — M81.6 LOCALIZED OSTEOPOROSIS WITHOUT CURRENT PATHOLOGICAL FRACTURE: Primary | ICD-10-CM

## 2023-11-16 DIAGNOSIS — Z71.85 VACCINE COUNSELING: ICD-10-CM

## 2023-11-16 DIAGNOSIS — Z86.018 HISTORY OF PITUITARY ADENOMA: ICD-10-CM

## 2023-11-16 PROBLEM — E66.3 OVERWEIGHT (BMI 25.0-29.9): Status: ACTIVE | Noted: 2023-11-16

## 2023-11-16 PROCEDURE — 1159F MED LIST DOCD IN RCRD: CPT | Performed by: INTERNAL MEDICINE

## 2023-11-16 PROCEDURE — 99214 OFFICE O/P EST MOD 30 MIN: CPT | Performed by: INTERNAL MEDICINE

## 2023-11-16 PROCEDURE — 1160F RVW MEDS BY RX/DR IN RCRD: CPT | Performed by: INTERNAL MEDICINE

## 2023-11-16 RX ORDER — CHLORAL HYDRATE 500 MG
1000 CAPSULE ORAL 2 TIMES DAILY WITH MEALS
Qty: 180 CAPSULE | Refills: 1 | Status: SHIPPED | OUTPATIENT
Start: 2023-11-16

## 2023-11-16 NOTE — PATIENT INSTRUCTIONS
Fish oil -100 mg 2x/d  Mucinex 2x/d prn congestion  Consider evista/fosamax-0ake 1200 mg daily  Vit D daily 1000 mg daily  Rec RSV vaccine  [employer] pal savana

## 2023-11-16 NOTE — ASSESSMENT & PLAN NOTE
Recent DEXA scan with osteoporosis -patient taking calcium with vitamin D  Had discussed Evista with patient and still considering this    Plan needs -DEXA scan every 2 years continue with calcium with vitamin D-consider Evista in the future or other alternative patient to consider.-declined meds at present

## 2023-11-16 NOTE — PROGRESS NOTES
CHIEF COMPLAINT  Archana Delvalle presents to [facility] for follow-up of Nasal Congestion (Pt states her nasal cavity is clogged and her ears feel full, but overall she does not feel bad ).    HPI    67 yo pt here for 4 month check up and review of labs and above concerns    Congestion x 3 days    Vit D def-=38    Osteoporosis-new dx--declined meds-taking Ca/Vit D-take daily and vit D daily    Chol--elev Taking meds    AR-stable     Instructions7/20/23   Return in about 3 months (around 10/20/2023) for Recheck.  Continue with vitamin D 1000 units -goal is 30-40-in addition to calcium/vit D-can take 3x/week  Discussed Evista at next visit again-declined meds  Continue with multivitamins once a day  Do labs in 3 months 1 week prior to appointment           7/20/23 visit  here for checkup and review of labs     Hematuria work-up underway through urology clinic seen June 27, 2023-for cystoscopy with Dr. Diaz-no dysuria or frequency     Chol-stable with meds     Records reviewed, meds reviewed in detail, labs reviewed with patient.  Plan of care is as follows below.      PMH- No chest pain or shortness of breath now.  No more recent palpitations.  She had bilateral cataract extractions done the most recent one was done since her last visit by Dr. Gomez.  The other would on the right with stents in 2019.  She seemed very well.  COVID illness in November 2020 before vaccines.  Up-to-date on COVID vaccines.  History of MVP and occasional palpitations and has seen cardiology periodically for that.  Negative colonoscopy in 2018.  History of mild asthma and allergies.  Had pulmonary functions in 2020 that showed no evidence of rash obstruction.  No response to bronchodilator and no restriction.  Diffusing capacity was normal.  FEV1/FVC was 82%.  FEV1 was 110% predicted.  She is to have a repeat colonoscopy in 2023 for follow-up on 1 TA polyp in March 2018.  Perennial allergies to dust and  "molds.  Previous allergy evaluation.  She has been on Flonase and Zyrtec in the past.  Has ProAir to use as needed.     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 son with 2 grandkids-lives nearby       Current Outpatient Medications:     albuterol sulfate  (90 Base) MCG/ACT inhaler, INHALE 2 PUFFS EVERY 4 HOURS AS NEEDED, Disp: 8.5 g, Rfl: 1    calcium carbonate (OS-JOSIE) 600 MG tablet, Take 1 tablet by mouth Daily., Disp: , Rfl:     cetirizine (ZyrTEC) 5 MG chewable tablet, Chew 1 tablet Daily., Disp: , Rfl:     cholecalciferol (VITAMIN D3) 25 MCG (1000 UT) tablet, Take 1 tablet by mouth Daily., Disp: , Rfl:     metoprolol succinate XL (TOPROL-XL) 25 MG 24 hr tablet, TAKE 1/2 TABLET BY MOUTH EVERY DAY, Disp: 45 tablet, Rfl: 3    multivitamin with minerals tablet tablet, Take 1 tablet by mouth Daily., Disp: , Rfl:     rosuvastatin (CRESTOR) 10 MG tablet, Take 0.5 tablets by mouth Daily., Disp: 45 tablet, Rfl: 3    Omega-3 Fatty Acids (fish oil) 1000 MG capsule capsule, Take 1 capsule by mouth 2 (Two) Times a Day With Meals., Disp: 180 capsule, Rfl: 1    RSVPreF3 Vac Recomb Adjuvanted (AREXVY) 120 MCG/0.5ML reconstituted suspension injection, Inject 0.5 mL into the appropriate muscle as directed by prescriber 1 (One) Time for 1 dose., Disp: 0.5 mL, Rfl: 0   PFSH reviewed.      OBJECTIVE  Vital Signs  Vitals:    11/16/23 1020   BP: 125/73   BP Location: Left arm   Patient Position: Sitting   Cuff Size: Adult   Pulse: 90   Temp: 97.8 °F (36.6 °C)   TempSrc: Infrared   SpO2: 99%   Weight: 75.8 kg (167 lb 3.2 oz)   Height: 160 cm (62.99\")      Body mass index is 29.63 kg/m².    Physical Exam  Vitals and nursing note reviewed.   Constitutional:       Appearance: Normal appearance.   HENT:      Head: Normocephalic and atraumatic.   Cardiovascular:      Rate and Rhythm: Normal rate and regular rhythm.   Pulmonary:      Effort: Pulmonary effort is normal.      Breath sounds: " "Normal breath sounds.   Abdominal:      Palpations: Abdomen is soft.   Musculoskeletal:      Cervical back: Normal range of motion and neck supple.   Neurological:      General: No focal deficit present.      Mental Status: She is alert and oriented to person, place, and time.          RESULTS REVIEW  No results found for: \"PROBNP\", \"BNP\"  CMP          3/21/2023    08:07 7/12/2023    07:15 11/9/2023    07:26   CMP   Glucose 90  84  87    BUN 10  17  13    Creatinine 0.70  0.73  0.81    EGFR 96.1  90.8  80.2    Sodium 139  140  140    Potassium 4.0  3.9  4.1    Chloride 102  103  103    Calcium 9.4  9.2  9.5    Total Protein 7.3  6.7  7.0    Albumin 4.7  4.4  4.7    Globulin 2.6  2.3  2.3    Total Bilirubin 0.3  0.4  0.4    Alkaline Phosphatase 59  51  49    AST (SGOT) 25  21  22    ALT (SGPT) 15  13  17    Albumin/Globulin Ratio 1.8  1.9  2.0    BUN/Creatinine Ratio 14.3  23.3  16.0    Anion Gap 11.6  10.0  7.0      CBC w/diff          3/21/2023    08:07 7/12/2023    07:15 11/9/2023    07:26   CBC w/Diff   WBC 5.03  5.71  4.92    RBC 4.07  4.08  4.02    Hemoglobin 13.1  12.8  13.1    Hematocrit 38.6  38.6  38.9    MCV 94.8  94.6  96.8    MCH 32.2  31.4  32.6    MCHC 33.9  33.2  33.7    RDW 12.1  12.1  12.1    Platelets 192  184  190    Neutrophil Rel % 51.1  50.6  48.0    Immature Granulocyte Rel %  0.2  0.2    Lymphocyte Rel % 35.6  35.9  38.8    Monocyte Rel % 10.1  10.7  10.4    Eosinophil Rel % 2.2  1.9  1.8    Basophil Rel % 0.8  0.7  0.8       Lipid Panel          3/21/2023    08:07 7/12/2023    07:15 11/9/2023    07:26   Lipid Panel   Total Cholesterol 151  164  145    Triglycerides 193  240  237    HDL Cholesterol 42  42  43    VLDL Cholesterol 32  40  38    LDL Cholesterol  77  82  64    LDL/HDL Ratio 1.68  1.76  1.27       Lab Results   Component Value Date    TSH 2.410 11/09/2023    TSH 2.400 07/12/2023    TSH 2.750 03/21/2023      Lab Results   Component Value Date    FREET4 0.95 11/09/2023    FREET4 " 1.01 07/12/2023    FREET4 1.03 03/21/2023         Lab Results   Component Value Date    APZPBOKF71 775 11/09/2023    ENFF35PQ 38.1 11/09/2023    MG 2.2 11/09/2023        Mammo Screening Digital Tomosynthesis Bilateral With CAD    Result Date: 8/3/2023   Benign mammogram. Suggest routine mammographic screening.  RECOMMENDATION(S):  ROUTINE MAMMOGRAM AND CLINICAL EVALUATION IN 12 MONTHS.   BIRADS:  DIAGNOSTIC CATEGORY 2--BENIGN FINDING   BREAST COMPOSITION: Heterogeneously dense,which may obscure small masses.  PLEASE NOTE:  A NORMAL MAMMOGRAM DOES NOT EXCLUDE THE POSSIBILITY OF BREAST CANCER. ANY CLINICALLY SUSPICIOUS PALPABLE LUMP SHOULD BE BIOPSIED.      ZAHRA AGUIAR MD       Electronically Signed and Approved By: ZAHRA AGUIAR MD on 8/03/2023 at 16:01             CT Abdomen Pelvis With & Without Contrast    Result Date: 7/27/2023    1. Unremarkable CT urogram, specifically no findings to explain reported microscopic hematuria. 2. 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 3. Small sliding hiatal hernia.     DIANA MUÑOZ MD       Electronically Signed and Approved By: DIANA MUÑOZ MD on 7/27/2023 at 8:32                        ASSESSMENT & PLAN  Diagnoses and all orders for this visit:    1. Localized osteoporosis without current pathological fracture (Primary)  Comments:  Discussed evista /fosamax-and referral to Rheum-declines at present-take ca/vit D daily and MVI--1200 mg calcium  Assessment & Plan:  Recent DEXA scan with osteoporosis -patient taking calcium with vitamin D  Had discussed Evista with patient and still considering this    Plan needs -DEXA scan every 2 years continue with calcium with vitamin D-consider Evista in the future or other alternative patient to consider.-declined meds at present    Orders:  -     Omega-3 Fatty Acids (fish oil) 1000 MG capsule capsule; Take 1 capsule by mouth 2 (Two) Times a Day With Meals.  Dispense: 180 capsule; Refill: 1  -      RSVPreF3 Vac Recomb Adjuvanted (AREXVY) 120 MCG/0.5ML reconstituted suspension injection; Inject 0.5 mL into the appropriate muscle as directed by prescriber 1 (One) Time for 1 dose.  Dispense: 0.5 mL; Refill: 0  -     Comprehensive Metabolic Panel; Future  -     Lipid Panel; Future  -     TSH+Free T4; Future  -     T3, Free; Future  -     Vitamin B12; Future  -     Folate; Future  -     Magnesium; Future  -     Vitamin D,25-Hydroxy; Future  -     CBC & Differential; Future    2. Hyperlipemia, mixed  Comments:  Triglycerides are greater than 250 we will add fish oil 1000 mg twice a day.  Continue with Crestor 10 mg daily  Orders:  -     Omega-3 Fatty Acids (fish oil) 1000 MG capsule capsule; Take 1 capsule by mouth 2 (Two) Times a Day With Meals.  Dispense: 180 capsule; Refill: 1  -     RSVPreF3 Vac Recomb Adjuvanted (AREXVY) 120 MCG/0.5ML reconstituted suspension injection; Inject 0.5 mL into the appropriate muscle as directed by prescriber 1 (One) Time for 1 dose.  Dispense: 0.5 mL; Refill: 0  -     Comprehensive Metabolic Panel; Future  -     Lipid Panel; Future  -     TSH+Free T4; Future  -     T3, Free; Future  -     Vitamin B12; Future  -     Folate; Future  -     Magnesium; Future  -     Vitamin D,25-Hydroxy; Future  -     CBC & Differential; Future    3. Non-seasonal allergic rhinitis due to pollen  Comments:  Use Mucinex twice a day as needed for congestion  Orders:  -     Omega-3 Fatty Acids (fish oil) 1000 MG capsule capsule; Take 1 capsule by mouth 2 (Two) Times a Day With Meals.  Dispense: 180 capsule; Refill: 1  -     RSVPreF3 Vac Recomb Adjuvanted (AREXVY) 120 MCG/0.5ML reconstituted suspension injection; Inject 0.5 mL into the appropriate muscle as directed by prescriber 1 (One) Time for 1 dose.  Dispense: 0.5 mL; Refill: 0  -     Comprehensive Metabolic Panel; Future  -     Lipid Panel; Future  -     TSH+Free T4; Future  -     T3, Free; Future  -     Vitamin B12; Future  -     Folate; Future  -      Magnesium; Future  -     Vitamin D,25-Hydroxy; Future  -     CBC & Differential; Future    4. Non-recurrent acute serous otitis media of both ears  Comments:  Use Mucinex twice a day as needed for congestion.  Orders:  -     Omega-3 Fatty Acids (fish oil) 1000 MG capsule capsule; Take 1 capsule by mouth 2 (Two) Times a Day With Meals.  Dispense: 180 capsule; Refill: 1  -     RSVPreF3 Vac Recomb Adjuvanted (AREXVY) 120 MCG/0.5ML reconstituted suspension injection; Inject 0.5 mL into the appropriate muscle as directed by prescriber 1 (One) Time for 1 dose.  Dispense: 0.5 mL; Refill: 0  -     Comprehensive Metabolic Panel; Future  -     Lipid Panel; Future  -     TSH+Free T4; Future  -     T3, Free; Future  -     Vitamin B12; Future  -     Folate; Future  -     Magnesium; Future  -     Vitamin D,25-Hydroxy; Future  -     CBC & Differential; Future    5. History of pituitary adenoma  -     Omega-3 Fatty Acids (fish oil) 1000 MG capsule capsule; Take 1 capsule by mouth 2 (Two) Times a Day With Meals.  Dispense: 180 capsule; Refill: 1  -     RSVPreF3 Vac Recomb Adjuvanted (AREXVY) 120 MCG/0.5ML reconstituted suspension injection; Inject 0.5 mL into the appropriate muscle as directed by prescriber 1 (One) Time for 1 dose.  Dispense: 0.5 mL; Refill: 0  -     Comprehensive Metabolic Panel; Future  -     Lipid Panel; Future  -     TSH+Free T4; Future  -     T3, Free; Future  -     Vitamin B12; Future  -     Folate; Future  -     Magnesium; Future  -     Vitamin D,25-Hydroxy; Future  -     CBC & Differential; Future    6. Vaccine counseling  Comments:  Recommend ARexy vvaccine for RSV at pharmacy.  Orders:  -     Omega-3 Fatty Acids (fish oil) 1000 MG capsule capsule; Take 1 capsule by mouth 2 (Two) Times a Day With Meals.  Dispense: 180 capsule; Refill: 1  -     RSVPreF3 Vac Recomb Adjuvanted (AREXVY) 120 MCG/0.5ML reconstituted suspension injection; Inject 0.5 mL into the appropriate muscle as directed by prescriber 1 (One)  Time for 1 dose.  Dispense: 0.5 mL; Refill: 0  -     Comprehensive Metabolic Panel; Future  -     Lipid Panel; Future  -     TSH+Free T4; Future  -     T3, Free; Future  -     Vitamin B12; Future  -     Folate; Future  -     Magnesium; Future  -     Vitamin D,25-Hydroxy; Future  -     CBC & Differential; Future                 Patient Instructions   Fish oil -100 mg 2x/d  Mucinex 2x/d prn congestion  Consider evista/fosamax-0ake 1200 mg daily  Vit D daily 1000 mg daily  Rec RSV vaccine  [employer]     FOLLOW UP  Return in about 4 months (around 3/16/2024) for Next scheduled follow up.    Patient was given instructions and counseling regarding her condition or for health maintenance advice. Please see specific information pulled into the AVS if appropriate.

## 2023-11-18 NOTE — PROGRESS NOTES
[facility]  Cardiology progress Note    Patient Name: Archana Delvalle  : 1955    CHIEF COMPLAINT  Palpitations        Subjective   Subjective     HISTORY OF PRESENT ILLNESS    Archana Delvalle is a 68 y.o. female with history of palpitations and tachycardia.  No further palpitations.    REVIEW OF SYSTEMS    Constitutional:    No fever, no weight loss  Skin:     No rash  Otolaryngeal:    No difficulty swallowing  Cardiovascular: See HPI.  Pulmonary:    No cough, no sputum production    Personal History     Social History:    reports that she has never smoked. She has never been exposed to tobacco smoke. She has never used smokeless tobacco. She reports that she does not drink alcohol and does not use drugs.    Home Medications:  Current Outpatient Medications on File Prior to Visit   Medication Sig    albuterol sulfate  (90 Base) MCG/ACT inhaler INHALE 2 PUFFS EVERY 4 HOURS AS NEEDED    calcium carbonate (OS-JOSIE) 600 MG tablet Take 1 tablet by mouth Daily.    cetirizine (ZyrTEC) 5 MG chewable tablet Chew 1 tablet Daily.    cholecalciferol (VITAMIN D3) 25 MCG (1000 UT) tablet Take 1 tablet by mouth Daily.    metoprolol succinate XL (TOPROL-XL) 25 MG 24 hr tablet TAKE 1/2 TABLET BY MOUTH EVERY DAY    multivitamin with minerals tablet tablet Take 1 tablet by mouth Daily.    Omega-3 Fatty Acids (fish oil) 1000 MG capsule capsule Take 1 capsule by mouth 2 (Two) Times a Day With Meals.    rosuvastatin (CRESTOR) 10 MG tablet Take 0.5 tablets by mouth Daily.     No current facility-administered medications on file prior to visit.       Past Medical History:   Diagnosis Date    Asthma     High cholesterol     Tachycardia        Allergies:  Allergies   Allergen Reactions    Codeine Rash    Tetracycline Rash    Levaquin [Levofloxacin] Diarrhea    Singulair [Montelukast Sodium] Nausea Only       Objective    Objective       Vitals:   Heart Rate:  [92] 92  BP: (112)/(54) 112/54  Body mass index is 29.81  kg/m².     PHYSICAL EXAM:    General Appearance:   well developed  well nourished  HENT:   oropharynx moist  lips not cyanotic  Neck:  thyroid not enlarged  supple  Respiratory:  no respiratory distress  normal breath sounds  no rales  Cardiovascular:  no jugular venous distention  regular rhythm  apical impulse normal  S1 normal, S2 normal  no S3, no S4   no murmur  no rub, no thrill  carotid pulses normal; no bruit  pedal pulses normal  lower extremity edema: none    Skin:   warm, dry  Psychiatric:  judgement and insight appropriate  normal mood and affect        Result Review:  I have personally reviewed the available results from  [x]  Laboratory  [x]  EKG  [x]  Cardiology  [x]  Medications  [x]  Old records  []  Other:       ECG 12 Lead    Date/Time: 11/21/2023 3:11 PM  Performed by: Brennen Joe MD    Authorized by: Brennen Joe MD  Comparison: compared with previous ECG   Similar to previous ECG  Rhythm: sinus rhythm  Rate: normal  QRS axis: normal    Clinical impression: normal ECG  Comments: Normal sinus rhythm.  No significant changes compared to previous EKG        Lab Results   Component Value Date    CHOL 145 11/09/2023    CHOL 164 07/12/2023    CHOL 151 03/21/2023     Lab Results   Component Value Date    TRIG 237 (H) 11/09/2023    TRIG 240 (H) 07/12/2023    TRIG 193 (H) 03/21/2023     Lab Results   Component Value Date    HDL 43 11/09/2023    HDL 42 07/12/2023    HDL 42 03/21/2023     Lab Results   Component Value Date    LDL 64 11/09/2023    LDL 82 07/12/2023    LDL 77 03/21/2023     Lab Results   Component Value Date    VLDL 38 11/09/2023    VLDL 40 07/12/2023    VLDL 32 03/21/2023     Results for orders placed during the hospital encounter of 04/27/22    Adult Transthoracic Echo Complete W/ Cont if Necessary Per Protocol    Interpretation Summary  Normal left ventricular systolic function.  No significant valvular abnormalities noted.     Impression/Plan:  1.   Palpitations/tachycardia stable: Continue Toprol-XL 12.5 mg once a day.  No further palpitations.  2.  Hyperlipidemia: Continue Crestor 5 mg once a day.  Monitor lipid and hepatic profile.           Brennen Joe MD   11/21/23   15:10 EST

## 2023-11-21 ENCOUNTER — OFFICE VISIT (OUTPATIENT)
Dept: CARDIOLOGY | Facility: CLINIC | Age: 68
End: 2023-11-21
Payer: MEDICARE

## 2023-11-21 VITALS
HEART RATE: 92 BPM | BODY MASS INDEX: 30 KG/M2 | HEIGHT: 62 IN | DIASTOLIC BLOOD PRESSURE: 54 MMHG | WEIGHT: 163 LBS | SYSTOLIC BLOOD PRESSURE: 112 MMHG

## 2023-11-21 DIAGNOSIS — E78.2 HYPERLIPEMIA, MIXED: ICD-10-CM

## 2023-11-21 DIAGNOSIS — R00.2 PALPITATIONS: Primary | ICD-10-CM

## 2023-11-21 PROCEDURE — 1159F MED LIST DOCD IN RCRD: CPT | Performed by: SPECIALIST

## 2023-11-21 PROCEDURE — 1160F RVW MEDS BY RX/DR IN RCRD: CPT | Performed by: SPECIALIST

## 2024-03-06 RX ORDER — ROSUVASTATIN CALCIUM 10 MG/1
5 TABLET, COATED ORAL DAILY
Qty: 45 TABLET | Refills: 3 | Status: SHIPPED | OUTPATIENT
Start: 2024-03-06

## 2024-04-04 ENCOUNTER — LAB (OUTPATIENT)
Dept: LAB | Facility: HOSPITAL | Age: 69
End: 2024-04-04
Payer: MEDICARE

## 2024-04-04 DIAGNOSIS — M81.6 LOCALIZED OSTEOPOROSIS WITHOUT CURRENT PATHOLOGICAL FRACTURE: ICD-10-CM

## 2024-04-04 DIAGNOSIS — E78.2 HYPERLIPEMIA, MIXED: ICD-10-CM

## 2024-04-04 DIAGNOSIS — J30.1 NON-SEASONAL ALLERGIC RHINITIS DUE TO POLLEN: ICD-10-CM

## 2024-04-04 DIAGNOSIS — Z71.85 VACCINE COUNSELING: ICD-10-CM

## 2024-04-04 DIAGNOSIS — H65.03 NON-RECURRENT ACUTE SEROUS OTITIS MEDIA OF BOTH EARS: ICD-10-CM

## 2024-04-04 DIAGNOSIS — Z86.018 HISTORY OF PITUITARY ADENOMA: ICD-10-CM

## 2024-04-04 LAB
25(OH)D3 SERPL-MCNC: 42.8 NG/ML (ref 30–100)
ALBUMIN SERPL-MCNC: 4.6 G/DL (ref 3.5–5.2)
ALBUMIN/GLOB SERPL: 1.7 G/DL
ALP SERPL-CCNC: 53 U/L (ref 39–117)
ALT SERPL W P-5'-P-CCNC: 14 U/L (ref 1–33)
ANION GAP SERPL CALCULATED.3IONS-SCNC: 11.9 MMOL/L (ref 5–15)
AST SERPL-CCNC: 24 U/L (ref 1–32)
BASOPHILS # BLD AUTO: 0.05 10*3/MM3 (ref 0–0.2)
BASOPHILS NFR BLD AUTO: 0.9 % (ref 0–1.5)
BILIRUB SERPL-MCNC: 0.4 MG/DL (ref 0–1.2)
BUN SERPL-MCNC: 16 MG/DL (ref 8–23)
BUN/CREAT SERPL: 19.5 (ref 7–25)
CALCIUM SPEC-SCNC: 9 MG/DL (ref 8.6–10.5)
CHLORIDE SERPL-SCNC: 103 MMOL/L (ref 98–107)
CHOLEST SERPL-MCNC: 161 MG/DL (ref 0–200)
CO2 SERPL-SCNC: 25.1 MMOL/L (ref 22–29)
CREAT SERPL-MCNC: 0.82 MG/DL (ref 0.57–1)
DEPRECATED RDW RBC AUTO: 41.9 FL (ref 37–54)
EGFRCR SERPLBLD CKD-EPI 2021: 79 ML/MIN/1.73
EOSINOPHIL # BLD AUTO: 0.09 10*3/MM3 (ref 0–0.4)
EOSINOPHIL NFR BLD AUTO: 1.6 % (ref 0.3–6.2)
ERYTHROCYTE [DISTWIDTH] IN BLOOD BY AUTOMATED COUNT: 12.1 % (ref 12.3–15.4)
FOLATE SERPL-MCNC: >20 NG/ML (ref 4.78–24.2)
GLOBULIN UR ELPH-MCNC: 2.7 GM/DL
GLUCOSE SERPL-MCNC: 93 MG/DL (ref 65–99)
HCT VFR BLD AUTO: 42.1 % (ref 34–46.6)
HDLC SERPL-MCNC: 45 MG/DL (ref 40–60)
HGB BLD-MCNC: 14.1 G/DL (ref 12–15.9)
IMM GRANULOCYTES # BLD AUTO: 0 10*3/MM3 (ref 0–0.05)
IMM GRANULOCYTES NFR BLD AUTO: 0 % (ref 0–0.5)
LDLC SERPL CALC-MCNC: 80 MG/DL (ref 0–100)
LDLC/HDLC SERPL: 1.62 {RATIO}
LYMPHOCYTES # BLD AUTO: 1.97 10*3/MM3 (ref 0.7–3.1)
LYMPHOCYTES NFR BLD AUTO: 35.7 % (ref 19.6–45.3)
MAGNESIUM SERPL-MCNC: 2.2 MG/DL (ref 1.6–2.4)
MCH RBC QN AUTO: 32 PG (ref 26.6–33)
MCHC RBC AUTO-ENTMCNC: 33.5 G/DL (ref 31.5–35.7)
MCV RBC AUTO: 95.5 FL (ref 79–97)
MONOCYTES # BLD AUTO: 0.45 10*3/MM3 (ref 0.1–0.9)
MONOCYTES NFR BLD AUTO: 8.2 % (ref 5–12)
NEUTROPHILS NFR BLD AUTO: 2.96 10*3/MM3 (ref 1.7–7)
NEUTROPHILS NFR BLD AUTO: 53.6 % (ref 42.7–76)
NRBC BLD AUTO-RTO: 0 /100 WBC (ref 0–0.2)
PLATELET # BLD AUTO: 205 10*3/MM3 (ref 140–450)
PMV BLD AUTO: 12.4 FL (ref 6–12)
POTASSIUM SERPL-SCNC: 4 MMOL/L (ref 3.5–5.2)
PROT SERPL-MCNC: 7.3 G/DL (ref 6–8.5)
RBC # BLD AUTO: 4.41 10*6/MM3 (ref 3.77–5.28)
SODIUM SERPL-SCNC: 140 MMOL/L (ref 136–145)
T3FREE SERPL-MCNC: 2.68 PG/ML (ref 2–4.4)
T4 FREE SERPL-MCNC: 1 NG/DL (ref 0.93–1.7)
TRIGL SERPL-MCNC: 215 MG/DL (ref 0–150)
TSH SERPL DL<=0.05 MIU/L-ACNC: 1.96 UIU/ML (ref 0.27–4.2)
VIT B12 BLD-MCNC: 858 PG/ML (ref 211–946)
VLDLC SERPL-MCNC: 36 MG/DL (ref 5–40)
WBC NRBC COR # BLD AUTO: 5.52 10*3/MM3 (ref 3.4–10.8)

## 2024-04-04 PROCEDURE — 80061 LIPID PANEL: CPT

## 2024-04-04 PROCEDURE — 80053 COMPREHEN METABOLIC PANEL: CPT

## 2024-04-04 PROCEDURE — 82607 VITAMIN B-12: CPT

## 2024-04-04 PROCEDURE — 83735 ASSAY OF MAGNESIUM: CPT

## 2024-04-04 PROCEDURE — 36415 COLL VENOUS BLD VENIPUNCTURE: CPT

## 2024-04-04 PROCEDURE — 84481 FREE ASSAY (FT-3): CPT

## 2024-04-04 PROCEDURE — 84443 ASSAY THYROID STIM HORMONE: CPT

## 2024-04-04 PROCEDURE — 82306 VITAMIN D 25 HYDROXY: CPT

## 2024-04-04 PROCEDURE — 84439 ASSAY OF FREE THYROXINE: CPT

## 2024-04-04 PROCEDURE — 85025 COMPLETE CBC W/AUTO DIFF WBC: CPT

## 2024-04-04 PROCEDURE — 82746 ASSAY OF FOLIC ACID SERUM: CPT

## 2024-04-10 ENCOUNTER — OFFICE VISIT (OUTPATIENT)
Dept: INTERNAL MEDICINE | Age: 69
End: 2024-04-10
Payer: MEDICARE

## 2024-04-10 VITALS
SYSTOLIC BLOOD PRESSURE: 105 MMHG | DIASTOLIC BLOOD PRESSURE: 70 MMHG | OXYGEN SATURATION: 95 % | HEART RATE: 80 BPM | HEIGHT: 63 IN | TEMPERATURE: 97.8 F | BODY MASS INDEX: 29.73 KG/M2 | WEIGHT: 167.8 LBS

## 2024-04-10 DIAGNOSIS — J30.1 NON-SEASONAL ALLERGIC RHINITIS DUE TO POLLEN: ICD-10-CM

## 2024-04-10 DIAGNOSIS — Z86.018 HISTORY OF PITUITARY ADENOMA: ICD-10-CM

## 2024-04-10 DIAGNOSIS — Z00.00 ENCOUNTER FOR SUBSEQUENT ANNUAL WELLNESS VISIT (AWV) IN MEDICARE PATIENT: Primary | ICD-10-CM

## 2024-04-10 DIAGNOSIS — Z12.31 SCREENING MAMMOGRAM FOR BREAST CANCER: ICD-10-CM

## 2024-04-10 DIAGNOSIS — E78.2 HYPERLIPEMIA, MIXED: ICD-10-CM

## 2024-04-10 DIAGNOSIS — M81.0 AGE-RELATED OSTEOPOROSIS WITHOUT CURRENT PATHOLOGICAL FRACTURE: ICD-10-CM

## 2024-04-10 DIAGNOSIS — E55.9 VITAMIN D DEFICIENCY: ICD-10-CM

## 2024-04-10 NOTE — PROGRESS NOTES
The ABCs of the Annual Wellness Visit  Subsequent Medicare Wellness Visit    Subjective    Archana Delvalle is a 68 y.o. female who presents for a Subsequent Medicare Wellness Visit.    The following portions of the patient's history were reviewed and   updated as appropriate: allergies, current medications, past family history, past medical history, past social history, past surgical history, and problem list.    Compared to one year ago, the patient feels her physical   health is the same.    Compared to one year ago, the patient feels her mental   health is the same.    Recent Hospitalizations:  She was not admitted to the hospital during the last year.       Current Medical Providers:  Patient Care Team:  Karena Negron MD as PCP - General (Internal Medicine)  Opal Villa APRN as Nurse Practitioner (Urology)  Evette Duggan MD as Consulting Physician (Urology)  Brennen Joe MD as Consulting Physician (Cardiology)    Outpatient Medications Prior to Visit   Medication Sig Dispense Refill    calcium carbonate (OS-JOSIE) 600 MG tablet Take 1 tablet by mouth Daily.      cetirizine (ZyrTEC) 5 MG chewable tablet Chew 1 tablet Daily.      cholecalciferol (VITAMIN D3) 25 MCG (1000 UT) tablet Take 1 tablet by mouth Daily.      metoprolol succinate XL (TOPROL-XL) 25 MG 24 hr tablet TAKE 1/2 TABLET BY MOUTH EVERY DAY 45 tablet 3    multivitamin with minerals tablet tablet Take 1 tablet by mouth Daily.      Omega-3 Fatty Acids (fish oil) 1000 MG capsule capsule Take 1 capsule by mouth 2 (Two) Times a Day With Meals. 180 capsule 1    rosuvastatin (CRESTOR) 10 MG tablet TAKE 1/2 TABLET BY MOUTH EVERY DAY 45 tablet 3     No facility-administered medications prior to visit.       No opioid medication identified on active medication list. I have reviewed chart for other potential  high risk medication/s and harmful drug interactions in the elderly.        Aspirin is not on active medication  "list.  Aspirin use is not indicated based on review of current medical condition/s. Risk of harm outweighs potential benefits.  .    Patient Active Problem List   Diagnosis    Palpitations    Hyperlipemia, mixed    History of pituitary adenoma    Asthma exacerbation, mild    H/O benign breast biopsy    Osteoporosis without current pathological fracture    Screening for malignant neoplasm of colon    Non-seasonal allergic rhinitis due to pollen    Vitamin D deficiency    Other microscopic hematuria    History of colon polyps    Overweight (BMI 25.0-29.9)     Advance Care Planning   Advance Care Planning     Advance Directive is not on file.  ACP discussion was held with the patient during this visit. Patient has an advance directive (not in EMR), copy requested.     Objective    Vitals:    04/10/24 1023   BP: 105/70   BP Location: Left arm   Patient Position: Sitting   Cuff Size: Adult   Pulse: 80   Temp: 97.8 °F (36.6 °C)   TempSrc: Infrared   SpO2: 95%   Weight: 76.1 kg (167 lb 12.8 oz)   Height: 160 cm (62.99\")     Estimated body mass index is 29.73 kg/m² as calculated from the following:    Height as of this encounter: 160 cm (62.99\").    Weight as of this encounter: 76.1 kg (167 lb 12.8 oz).    BMI is >= 25 and <30. (Overweight) The following options were offered after discussion;: weight loss educational material (shared in after visit summary), exercise counseling/recommendations, and nutrition counseling/recommendations      Does the patient have evidence of cognitive impairment? No    Lab Results   Component Value Date    TRIG 215 (H) 04/04/2024    HDL 45 04/04/2024    LDL 80 04/04/2024    VLDL 36 04/04/2024        HEALTH RISK ASSESSMENT    Smoking Status:  Social History     Tobacco Use   Smoking Status Never    Passive exposure: Never   Smokeless Tobacco Never     Alcohol Consumption:  Social History     Substance and Sexual Activity   Alcohol Use Never    Comment: Current some day, drinks rarely; wine "     Fall Risk Screen:    STEADI Fall Risk Assessment was completed, and patient is at LOW risk for falls.Assessment completed on:4/10/2024    Depression Screenin/10/2024    10:27 AM   PHQ-2/PHQ-9 Depression Screening   Little Interest or Pleasure in Doing Things 0-->not at all   Feeling Down, Depressed or Hopeless 0-->not at all   PHQ-9: Brief Depression Severity Measure Score 0       Health Habits and Functional and Cognitive Screenin/10/2024    10:25 AM   Functional & Cognitive Status   Do you have difficulty preparing food and eating? No   Do you have difficulty bathing yourself, getting dressed or grooming yourself? No   Do you have difficulty using the toilet? No   Do you have difficulty moving around from place to place? No   Do you have trouble with steps or getting out of a bed or a chair? No   Current Diet Well Balanced Diet   Dental Exam Up to date   Eye Exam Up to date   Exercise (times per week) 3 times per week   Current Exercises Include Treadmill;Walking   Do you need help using the phone?  No   Are you deaf or do you have serious difficulty hearing?  No   Do you need help to go to places out of walking distance? No   Do you need help shopping? No   Do you need help preparing meals?  No   Do you need help with housework?  No   Do you need help with laundry? No   Do you need help taking your medications? No   Do you need help managing money? No   Do you ever drive or ride in a car without wearing a seat belt? No   Have you felt unusual stress, anger or loneliness in the last month? No   Who do you live with? Alone   If you need help, do you have trouble finding someone available to you? No   Have you been bothered in the last four weeks by sexual problems? No   Do you have difficulty concentrating, remembering or making decisions? No       Age-appropriate Screening Schedule:  Refer to the list below for future screening recommendations based on patient's age, sex and/or medical  conditions. Orders for these recommended tests are listed in the plan section. The patient has been provided with a written plan.    Health Maintenance   Topic Date Due    ZOSTER VACCINE (1 of 2) 05/11/2024 (Originally 6/4/2007)    RSV Vaccine - Adults (1 - 1-dose 60+ series) 04/10/2025 (Originally 6/4/2017)    INFLUENZA VACCINE  08/01/2024    DXA SCAN  02/07/2025    LIPID PANEL  04/04/2025    ANNUAL WELLNESS VISIT  04/10/2025    BMI FOLLOWUP  04/10/2025    MAMMOGRAM  08/03/2025    COLORECTAL CANCER SCREENING  08/08/2028    TDAP/TD VACCINES (2 - Td or Tdap) 09/20/2030    HEPATITIS C SCREENING  Completed    COVID-19 Vaccine  Completed    Pneumococcal Vaccine 65+  Completed                  CMS Preventative Services Quick Reference  Risk Factors Identified During Encounter  Immunizations Discussed/Encouraged: Shingrix  The above risks/problems have been discussed with the patient.  Pertinent information has been shared with the patient in the After Visit Summary.  An After Visit Summary and PPPS were made available to the patient.    Follow Up:   Next Medicare Wellness visit to be scheduled in 1 year.       Additional E&M Note during same encounter follows:  Patient has multiple medical problems which are significant and separately identifiable that require additional work above and beyond the Medicare Wellness Visit.      Chief Complaint  Medicare Wellness-subsequent (Pt is a 68 yr old female presenting to Internal Medicine for medicare wellness; Pt reports no further concerns and/or complaints. /) Here for  f/u of chol, AR, osteoporosis among others and labs    Subjective        HPI  Archana Delvalle is also being seen today for f/u of chol, AR, osteoporosis among others and labs    Process taking calcium 1200 mg daily  Chol- LDL 80 HDL 45 Tchol 161  AR-stable with meds   Older notes  Patient Instructions 11/10/23    Fish oil -100 mg 2x/d  Mucinex 2x/d prn congestion  Consider evista/fosamax-0ake 1200 mg daily  Vit D  daily 1000 mg daily  Rec RSV vaccine  Myfitness pal savana       11/10/23 visit   here for 4 month check up and review of labs and above concerns     Congestion x 3 days     Vit D def-=38     Osteoporosis-new dx--declined meds-taking Ca/Vit D-take daily and vit D daily     Chol--elev Taking meds     AR-stable      Instructions7/20/23   Return in about 3 months (around 10/20/2023) for Recheck.  Continue with vitamin D 1000 units -goal is 30-40-in addition to calcium/vit D-can take 3x/week  Discussed Evista at next visit again-declined meds  Continue with multivitamins once a day  Do labs in 3 months 1 week prior to appointment            7/20/23 visit  here for checkup and review of labs     Hematuria work-up underway through urology clinic seen June 27, 2023-for cystoscopy with Dr. Diaz-no dysuria or frequency     Chol-stable with meds     Records reviewed, meds reviewed in detail, labs reviewed with patient.  Plan of care is as follows below.      PMH- No chest pain or shortness of breath now.  No more recent palpitations.  She had bilateral cataract extractions done the most recent one was done since her last visit by Dr. Gomez.  The other would on the right with stents in 2019.  She seemed very well.  COVID illness in November 2020 before vaccines.  Up-to-date on COVID vaccines.  History of MVP and occasional palpitations and has seen cardiology periodically for that.  Negative colonoscopy in 2018.  History of mild asthma and allergies.  Had pulmonary functions in 2020 that showed no evidence of rash obstruction.  No response to bronchodilator and no restriction.  Diffusing capacity was normal.  FEV1/FVC was 82%.  FEV1 was 110% predicted.  She is to have a repeat colonoscopy in 2023 for follow-up on 1 TA polyp in March 2018.  Perennial allergies to dust and molds.  Previous allergy evaluation.  She has been on Flonase and Zyrtec in the past.  Has ProAir to use as needed.     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   Constitutional:       Appearance: Normal appearance.   HENT:      Head: Normocephalic and atraumatic.   Cardiovascular:      Rate and Rhythm: Normal rate and regular rhythm.   Pulmonary:      Effort: Pulmonary effort is normal.      Breath sounds: Normal breath sounds.   Abdominal:      Palpations: Abdomen is soft.   Musculoskeletal:      Cervical back: Normal range of motion and neck supple.   Neurological:      General: No focal deficit present.      Mental Status: She is alert and oriented to person, place, and time.          The following data was reviewed by: Karena Negron MD on 04/10/2024:  Common labs          7/12/2023    07:15 11/9/2023    07:26 4/4/2024    09:03   Common Labs   Glucose 84  87  93    BUN 17  13  16    Creatinine 0.73  0.81  0.82    Sodium 140  140  140    Potassium 3.9  4.1  4.0    Chloride 103  103  103    Calcium 9.2  9.5  9.0    Albumin 4.4  4.7  4.6    Total Bilirubin 0.4  0.4  0.4    Alkaline Phosphatase 51  49  53    AST (SGOT) 21  22  24    ALT (SGPT) 13  17  14    WBC 5.71  4.92  5.52    Hemoglobin 12.8  13.1  14.1    Hematocrit 38.6  38.9  42.1    Platelets 184  190  205    Total Cholesterol 164  145  161    Triglycerides 240  237  215    HDL Cholesterol 42  43  45    LDL Cholesterol  82  64  80      CMP          7/12/2023    07:15 11/9/2023    07:26 4/4/2024    09:03   CMP   Glucose 84  87  93    BUN 17  13  16    Creatinine 0.73  0.81  0.82    EGFR 90.8  80.2  79.0    Sodium 140  140  140    Potassium 3.9  4.1  4.0    Chloride 103  103  103    Calcium 9.2  9.5  9.0    Total Protein 6.7 "  7.0  7.3    Albumin 4.4  4.7  4.6    Globulin 2.3  2.3  2.7    Total Bilirubin 0.4  0.4  0.4    Alkaline Phosphatase 51  49  53    AST (SGOT) 21  22  24    ALT (SGPT) 13  17  14    Albumin/Globulin Ratio 1.9  2.0  1.7    BUN/Creatinine Ratio 23.3  16.0  19.5    Anion Gap 10.0  7.0  11.9      CBC          7/12/2023    07:15 11/9/2023    07:26 4/4/2024    09:03   CBC   WBC 5.71  4.92  5.52    RBC 4.08  4.02  4.41    Hemoglobin 12.8  13.1  14.1    Hematocrit 38.6  38.9  42.1    MCV 94.6  96.8  95.5    MCH 31.4  32.6  32.0    MCHC 33.2  33.7  33.5    RDW 12.1  12.1  12.1    Platelets 184  190  205      CBC w/diff          7/12/2023    07:15 11/9/2023    07:26 4/4/2024    09:03   CBC w/Diff   WBC 5.71  4.92  5.52    RBC 4.08  4.02  4.41    Hemoglobin 12.8  13.1  14.1    Hematocrit 38.6  38.9  42.1    MCV 94.6  96.8  95.5    MCH 31.4  32.6  32.0    MCHC 33.2  33.7  33.5    RDW 12.1  12.1  12.1    Platelets 184  190  205    Neutrophil Rel % 50.6  48.0  53.6    Immature Granulocyte Rel % 0.2  0.2  0.0    Lymphocyte Rel % 35.9  38.8  35.7    Monocyte Rel % 10.7  10.4  8.2    Eosinophil Rel % 1.9  1.8  1.6    Basophil Rel % 0.7  0.8  0.9      Lipid Panel          7/12/2023    07:15 11/9/2023    07:26 4/4/2024    09:03   Lipid Panel   Total Cholesterol 164  145  161    Triglycerides 240  237  215    HDL Cholesterol 42  43  45    VLDL Cholesterol 40  38  36    LDL Cholesterol  82  64  80    LDL/HDL Ratio 1.76  1.27  1.62      TSH          7/12/2023    07:15 11/9/2023    07:26 4/4/2024    09:03   TSH   TSH 2.400  2.410  1.960            BMI is >= 25 and <30. (Overweight) The following options were offered after discussion;: weight loss educational material (shared in after visit summary), exercise counseling/recommendations, and nutrition counseling/recommendations        Assessment and Plan   Diagnoses and all orders for this visit:    1. Encounter for subsequent annual wellness visit (AWV) in Medicare patient (Primary)  -      Comprehensive Metabolic Panel; Future  -     Lipid Panel; Future  -     TSH+Free T4; Future  -     Vitamin D,25-Hydroxy; Future  -     Magnesium; Future  -     CBC & Differential; Future  -     Vitamin B12; Future  -     Folate; Future    2. Hyperlipemia, mixed  Assessment & Plan:  Lipid panel is excellent.-no myalgias-Chol- LDL 80 HDL 45 Tchol 161  Assessment: Hyperlipidemia on rosuvastatin.  Plan: Rosuvastatin 10 mg at bedtime    Orders:  -     Comprehensive Metabolic Panel; Future  -     Lipid Panel; Future  -     TSH+Free T4; Future  -     Vitamin D,25-Hydroxy; Future  -     Magnesium; Future  -     CBC & Differential; Future  -     Vitamin B12; Future  -     Folate; Future    3. Non-seasonal allergic rhinitis due to pollen  Assessment & Plan:  Stable with cetirizine 5 mg 1 daily    Orders:  -     Comprehensive Metabolic Panel; Future  -     Lipid Panel; Future  -     TSH+Free T4; Future  -     Vitamin D,25-Hydroxy; Future  -     Magnesium; Future  -     CBC & Differential; Future  -     Vitamin B12; Future  -     Folate; Future    4. Age-related osteoporosis without current pathological fracture  Comments:  Repeat DEXA due in the spring 2025-offered rheumatology consult patient wants to wait until repeat DEXA done next year-continue calcium twice a day  Assessment & Plan:  Recent DEXA scan with osteoporosis -patient taking calcium with vitamin D  Had discussed Evista with patient and still considering this    Plan needs -DEXA scan every 2 years continue with calcium with vitamin D-consider Evista in the future or other alternative patient to consider.-declined meds at present    Orders:  -     Comprehensive Metabolic Panel; Future  -     Lipid Panel; Future  -     TSH+Free T4; Future  -     Vitamin D,25-Hydroxy; Future  -     Magnesium; Future  -     CBC & Differential; Future  -     Vitamin B12; Future  -     Folate; Future    5. Screening mammogram for breast cancer  -     Mammo Screening Digital  Tomosynthesis Bilateral With CAD; Future  -     Comprehensive Metabolic Panel; Future  -     Lipid Panel; Future  -     TSH+Free T4; Future  -     Vitamin D,25-Hydroxy; Future  -     Magnesium; Future  -     CBC & Differential; Future  -     Vitamin B12; Future  -     Folate; Future    6. History of pituitary adenoma  -     Comprehensive Metabolic Panel; Future  -     Lipid Panel; Future  -     TSH+Free T4; Future  -     Vitamin D,25-Hydroxy; Future  -     Magnesium; Future  -     CBC & Differential; Future  -     Vitamin B12; Future  -     Folate; Future    7. Vitamin D deficiency  -     Comprehensive Metabolic Panel; Future  -     Lipid Panel; Future  -     TSH+Free T4; Future  -     Vitamin D,25-Hydroxy; Future  -     Magnesium; Future  -     CBC & Differential; Future  -     Vitamin B12; Future  -     Folate; Future         Patient Instructions   Increase exercise from 3 days to 5 d/week -treadmill  Due for covid vaccine again -last one 10/2023-   Schedule mammogram due this year  DEXA scan due February 2025    Follow Up   Return in about 5 months (around 9/10/2024) for Recheck.  Patient was given instructions and counseling regarding her condition or for health maintenance advice. Please see specific information pulled into the AVS if appropriate.

## 2024-04-10 NOTE — PATIENT INSTRUCTIONS
Increase exercise from 3 days to 5 d/week -treadmill  Due for covid vaccine again -last one 10/2023-

## 2024-04-10 NOTE — ASSESSMENT & PLAN NOTE
Lipid panel is excellent.-no myalgias-Chol- LDL 80 HDL 45 Tchol 161  Assessment: Hyperlipidemia on rosuvastatin.  Plan: Rosuvastatin 10 mg at bedtime

## 2024-06-05 RX ORDER — METOPROLOL SUCCINATE 25 MG/1
12.5 TABLET, EXTENDED RELEASE ORAL DAILY
Qty: 45 TABLET | Refills: 3 | Status: SHIPPED | OUTPATIENT
Start: 2024-06-05

## 2024-06-06 NOTE — PROGRESS NOTES
[facility]  Cardiology progress Note    Patient Name: Archana Delvalle  : 1955    CHIEF COMPLAINT  Palpitations        Subjective   Subjective     HISTORY OF PRESENT ILLNESS    Archana Delvalle is a 69 y.o. female with history of palpitations.  No further palpitations.    REVIEW OF SYSTEMS    Constitutional:    No fever, no weight loss  Skin:     No rash  Otolaryngeal:    No difficulty swallowing  Cardiovascular: See HPI.  Pulmonary:    No cough, no sputum production    Personal History     Social History:    reports that she has never smoked. She has never been exposed to tobacco smoke. She has never used smokeless tobacco. She reports that she does not drink alcohol and does not use drugs.    Home Medications:  Current Outpatient Medications on File Prior to Visit   Medication Sig    calcium carbonate (OS-JOSIE) 600 MG tablet Take 1 tablet by mouth Daily.    cetirizine (ZyrTEC) 5 MG chewable tablet Chew 1 tablet Daily.    cholecalciferol (VITAMIN D3) 25 MCG (1000 UT) tablet Take 1 tablet by mouth Daily.    metoprolol succinate XL (TOPROL-XL) 25 MG 24 hr tablet TAKE 1/2 TABLET BY MOUTH DAILY    multivitamin with minerals tablet tablet Take 1 tablet by mouth Daily.    Omega-3 Fatty Acids (fish oil) 1000 MG capsule capsule Take 1 capsule by mouth 2 (Two) Times a Day With Meals.    rosuvastatin (CRESTOR) 10 MG tablet TAKE 1/2 TABLET BY MOUTH EVERY DAY     No current facility-administered medications on file prior to visit.       Past Medical History:   Diagnosis Date    Asthma     High cholesterol     Tachycardia        Allergies:  Allergies   Allergen Reactions    Codeine Rash    Levaquin [Levofloxacin] Diarrhea    Singulair [Montelukast Sodium] Nausea Only    Tetracycline Rash       Objective    Objective       Vitals:   Heart Rate:  [81] 81  BP: (108)/(60) 108/60  Body mass index is 28.95 kg/m².     PHYSICAL EXAM:    General Appearance:   well developed  well nourished  HENT:   oropharynx  moist  lips not cyanotic  Neck:  thyroid not enlarged  supple  Respiratory:  no respiratory distress  normal breath sounds  no rales  Cardiovascular:  no jugular venous distention  regular rhythm  apical impulse normal  S1 normal, S2 normal  no S3, no S4   no murmur  no rub, no thrill  carotid pulses normal; no bruit  pedal pulses normal  lower extremity edema: none    Skin:   warm, dry  Psychiatric:  judgement and insight appropriate  normal mood and affect        Result Review:  I have personally reviewed the available results from  [x]  Laboratory  [x]  EKG  [x]  Cardiology  [x]  Medications  [x]  Old records  []  Other:     Procedures  Lab Results   Component Value Date    CHOL 161 04/04/2024    CHOL 145 11/09/2023    CHOL 164 07/12/2023     Lab Results   Component Value Date    TRIG 215 (H) 04/04/2024    TRIG 237 (H) 11/09/2023    TRIG 240 (H) 07/12/2023     Lab Results   Component Value Date    HDL 45 04/04/2024    HDL 43 11/09/2023    HDL 42 07/12/2023     Lab Results   Component Value Date    LDL 80 04/04/2024    LDL 64 11/09/2023    LDL 82 07/12/2023     Lab Results   Component Value Date    VLDL 36 04/04/2024    VLDL 38 11/09/2023    VLDL 40 07/12/2023     Results for orders placed during the hospital encounter of 04/27/22    Adult Transthoracic Echo Complete W/ Cont if Necessary Per Protocol    Interpretation Summary  Normal left ventricular systolic function.  No significant valvular abnormalities noted.     Impression/Plan:  1.  Mixed hyperlipidemia: Continue Crestor 10  mg once a day.  Monitor lipid and hepatic profile.  2.  Palpitations/tachycardia stable: Continue Toprol-XL 12.5 mg once a day.  No palpitations.           Brennen Joe MD   06/11/24   12:24 EDT

## 2024-06-11 ENCOUNTER — OFFICE VISIT (OUTPATIENT)
Dept: CARDIOLOGY | Facility: CLINIC | Age: 69
End: 2024-06-11
Payer: MEDICARE

## 2024-06-11 VITALS
HEART RATE: 81 BPM | SYSTOLIC BLOOD PRESSURE: 108 MMHG | DIASTOLIC BLOOD PRESSURE: 60 MMHG | HEIGHT: 63 IN | BODY MASS INDEX: 28.95 KG/M2 | OXYGEN SATURATION: 99 % | WEIGHT: 163.4 LBS

## 2024-06-11 DIAGNOSIS — E78.2 HYPERLIPEMIA, MIXED: Primary | ICD-10-CM

## 2024-06-11 DIAGNOSIS — R00.2 PALPITATIONS: ICD-10-CM

## 2024-06-11 PROCEDURE — 99213 OFFICE O/P EST LOW 20 MIN: CPT | Performed by: SPECIALIST

## 2024-06-11 PROCEDURE — 1160F RVW MEDS BY RX/DR IN RCRD: CPT | Performed by: SPECIALIST

## 2024-06-11 PROCEDURE — 1159F MED LIST DOCD IN RCRD: CPT | Performed by: SPECIALIST

## 2024-08-20 ENCOUNTER — HOSPITAL ENCOUNTER (OUTPATIENT)
Dept: MAMMOGRAPHY | Facility: HOSPITAL | Age: 69
Discharge: HOME | End: 2024-08-20
Admitting: INTERNAL MEDICINE
Payer: MEDICARE

## 2024-08-20 DIAGNOSIS — Z12.31 SCREENING MAMMOGRAM FOR BREAST CANCER: ICD-10-CM

## 2024-08-20 PROCEDURE — 77067 SCR MAMMO BI INCL CAD: CPT

## 2024-08-20 PROCEDURE — 77063 BREAST TOMOSYNTHESIS BI: CPT

## 2024-08-30 ENCOUNTER — LAB (OUTPATIENT)
Dept: LAB | Facility: HOSPITAL | Age: 69
End: 2024-08-30
Payer: MEDICARE

## 2024-08-30 DIAGNOSIS — Z00.00 ENCOUNTER FOR SUBSEQUENT ANNUAL WELLNESS VISIT (AWV) IN MEDICARE PATIENT: ICD-10-CM

## 2024-08-30 DIAGNOSIS — Z12.31 SCREENING MAMMOGRAM FOR BREAST CANCER: ICD-10-CM

## 2024-08-30 DIAGNOSIS — J30.1 NON-SEASONAL ALLERGIC RHINITIS DUE TO POLLEN: ICD-10-CM

## 2024-08-30 DIAGNOSIS — M81.0 AGE-RELATED OSTEOPOROSIS WITHOUT CURRENT PATHOLOGICAL FRACTURE: ICD-10-CM

## 2024-08-30 DIAGNOSIS — E78.2 HYPERLIPEMIA, MIXED: ICD-10-CM

## 2024-08-30 DIAGNOSIS — Z86.018 HISTORY OF PITUITARY ADENOMA: ICD-10-CM

## 2024-08-30 DIAGNOSIS — E55.9 VITAMIN D DEFICIENCY: ICD-10-CM

## 2024-08-30 LAB
25(OH)D3 SERPL-MCNC: 43.8 NG/ML (ref 30–100)
ALBUMIN SERPL-MCNC: 4.6 G/DL (ref 3.5–5.2)
ALBUMIN/GLOB SERPL: 1.6 G/DL
ALP SERPL-CCNC: 46 U/L (ref 39–117)
ALT SERPL W P-5'-P-CCNC: 17 U/L (ref 1–33)
ANION GAP SERPL CALCULATED.3IONS-SCNC: 10.7 MMOL/L (ref 5–15)
AST SERPL-CCNC: 25 U/L (ref 1–32)
BASOPHILS # BLD AUTO: 0.03 10*3/MM3 (ref 0–0.2)
BASOPHILS NFR BLD AUTO: 0.7 % (ref 0–1.5)
BILIRUB SERPL-MCNC: 0.5 MG/DL (ref 0–1.2)
BUN SERPL-MCNC: 13 MG/DL (ref 8–23)
BUN/CREAT SERPL: 16.3 (ref 7–25)
CALCIUM SPEC-SCNC: 9.6 MG/DL (ref 8.6–10.5)
CHLORIDE SERPL-SCNC: 104 MMOL/L (ref 98–107)
CHOLEST SERPL-MCNC: 144 MG/DL (ref 0–200)
CO2 SERPL-SCNC: 25.3 MMOL/L (ref 22–29)
CREAT SERPL-MCNC: 0.8 MG/DL (ref 0.57–1)
DEPRECATED RDW RBC AUTO: 43.2 FL (ref 37–54)
EGFRCR SERPLBLD CKD-EPI 2021: 80.9 ML/MIN/1.73
EOSINOPHIL # BLD AUTO: 0.07 10*3/MM3 (ref 0–0.4)
EOSINOPHIL NFR BLD AUTO: 1.5 % (ref 0.3–6.2)
ERYTHROCYTE [DISTWIDTH] IN BLOOD BY AUTOMATED COUNT: 12.3 % (ref 12.3–15.4)
FOLATE SERPL-MCNC: >20 NG/ML (ref 4.78–24.2)
GLOBULIN UR ELPH-MCNC: 2.8 GM/DL
GLUCOSE SERPL-MCNC: 93 MG/DL (ref 65–99)
HCT VFR BLD AUTO: 39.9 % (ref 34–46.6)
HDLC SERPL-MCNC: 43 MG/DL (ref 40–60)
HGB BLD-MCNC: 13.1 G/DL (ref 12–15.9)
IMM GRANULOCYTES # BLD AUTO: 0.01 10*3/MM3 (ref 0–0.05)
IMM GRANULOCYTES NFR BLD AUTO: 0.2 % (ref 0–0.5)
LDLC SERPL CALC-MCNC: 72 MG/DL (ref 0–100)
LDLC/HDLC SERPL: 1.55 {RATIO}
LYMPHOCYTES # BLD AUTO: 1.68 10*3/MM3 (ref 0.7–3.1)
LYMPHOCYTES NFR BLD AUTO: 36.7 % (ref 19.6–45.3)
MAGNESIUM SERPL-MCNC: 2.3 MG/DL (ref 1.6–2.4)
MCH RBC QN AUTO: 31.8 PG (ref 26.6–33)
MCHC RBC AUTO-ENTMCNC: 32.8 G/DL (ref 31.5–35.7)
MCV RBC AUTO: 96.8 FL (ref 79–97)
MONOCYTES # BLD AUTO: 0.46 10*3/MM3 (ref 0.1–0.9)
MONOCYTES NFR BLD AUTO: 10 % (ref 5–12)
NEUTROPHILS NFR BLD AUTO: 2.33 10*3/MM3 (ref 1.7–7)
NEUTROPHILS NFR BLD AUTO: 50.9 % (ref 42.7–76)
NRBC BLD AUTO-RTO: 0 /100 WBC (ref 0–0.2)
PLATELET # BLD AUTO: 186 10*3/MM3 (ref 140–450)
PMV BLD AUTO: 12.9 FL (ref 6–12)
POTASSIUM SERPL-SCNC: 4.1 MMOL/L (ref 3.5–5.2)
PROT SERPL-MCNC: 7.4 G/DL (ref 6–8.5)
RBC # BLD AUTO: 4.12 10*6/MM3 (ref 3.77–5.28)
SODIUM SERPL-SCNC: 140 MMOL/L (ref 136–145)
T4 FREE SERPL-MCNC: 1.04 NG/DL (ref 0.92–1.68)
TRIGL SERPL-MCNC: 171 MG/DL (ref 0–150)
TSH SERPL DL<=0.05 MIU/L-ACNC: 3.37 UIU/ML (ref 0.27–4.2)
VIT B12 BLD-MCNC: 842 PG/ML (ref 211–946)
VLDLC SERPL-MCNC: 29 MG/DL (ref 5–40)
WBC NRBC COR # BLD AUTO: 4.58 10*3/MM3 (ref 3.4–10.8)

## 2024-08-30 PROCEDURE — 80061 LIPID PANEL: CPT

## 2024-08-30 PROCEDURE — 82607 VITAMIN B-12: CPT

## 2024-08-30 PROCEDURE — 82746 ASSAY OF FOLIC ACID SERUM: CPT

## 2024-08-30 PROCEDURE — 83735 ASSAY OF MAGNESIUM: CPT

## 2024-08-30 PROCEDURE — 80053 COMPREHEN METABOLIC PANEL: CPT

## 2024-08-30 PROCEDURE — 85025 COMPLETE CBC W/AUTO DIFF WBC: CPT

## 2024-08-30 PROCEDURE — 36415 COLL VENOUS BLD VENIPUNCTURE: CPT

## 2024-08-30 PROCEDURE — 84443 ASSAY THYROID STIM HORMONE: CPT

## 2024-08-30 PROCEDURE — 82306 VITAMIN D 25 HYDROXY: CPT

## 2024-08-30 PROCEDURE — 84439 ASSAY OF FREE THYROXINE: CPT

## 2024-09-04 ENCOUNTER — OFFICE VISIT (OUTPATIENT)
Dept: UROLOGY | Facility: CLINIC | Age: 69
End: 2024-09-04
Payer: MEDICARE

## 2024-09-04 VITALS
HEIGHT: 63 IN | BODY MASS INDEX: 28.88 KG/M2 | DIASTOLIC BLOOD PRESSURE: 69 MMHG | SYSTOLIC BLOOD PRESSURE: 110 MMHG | WEIGHT: 163 LBS

## 2024-09-04 DIAGNOSIS — R31.29 OTHER MICROSCOPIC HEMATURIA: Primary | ICD-10-CM

## 2024-09-04 LAB
BILIRUB BLD-MCNC: NEGATIVE MG/DL
CLARITY, POC: CLEAR
COLOR UR: YELLOW
EXPIRATION DATE: ABNORMAL
GLUCOSE UR STRIP-MCNC: NEGATIVE MG/DL
KETONES UR QL: ABNORMAL
LEUKOCYTE EST, POC: NEGATIVE
Lab: ABNORMAL
NITRITE UR-MCNC: NEGATIVE MG/ML
PH UR: 5 [PH] (ref 5–8)
PROT UR STRIP-MCNC: NEGATIVE MG/DL
RBC # UR STRIP: ABNORMAL /UL
SP GR UR: 1.03 (ref 1–1.03)
UROBILINOGEN UR QL: ABNORMAL

## 2024-09-04 NOTE — PROGRESS NOTES
"Chief Complaint  Microscopic hematuria    Subjective          Archana Delvalle presents to [facility] UROLOG[facility]    History of Present Illness  Patient is here for follow-up after microscopic hematuria.  She had a workup 1 year ago that was negative.  She has no complaints today.      Objective   Vital Signs:   /69   Ht 160 cm (62.99\")   Wt 73.9 kg (163 lb)   BMI 28.88 kg/m²       Physical Exam  Vitals and nursing note reviewed.   Constitutional:       Appearance: Normal appearance. She is well-developed.   Pulmonary:      Effort: Pulmonary effort is normal.      Breath sounds: Normal air entry.   Neurological:      Mental Status: She is alert and oriented to person, place, and time.      Motor: Motor function is intact.   Psychiatric:         Mood and Affect: Mood normal.         Behavior: Behavior normal.          Result Review :   The following data was reviewed by: Evette Duggan MD on 09/04/2024:    Results for orders placed or performed in visit on 09/04/24   POC Urinalysis Dipstick, Automated    Specimen: Urine   Result Value Ref Range    Color Yellow Yellow, Straw, Dark Yellow, Kathy    Clarity, UA Clear Clear    Specific Gravity  1.030 1.005 - 1.030    pH, Urine 5.0 5.0 - 8.0    Leukocytes Negative Negative    Nitrite, UA Negative Negative    Protein, POC Negative Negative mg/dL    Glucose, UA Negative Negative mg/dL    Ketones, UA Trace (A) Negative    Urobilinogen, UA 0.2 E.U./dL Normal, 0.2 E.U./dL    Bilirubin Negative Negative    Blood, UA Trace (A) Negative    Lot Number [id number]     Expiration Date 72,025             Assessment and Plan    Diagnoses and all orders for this visit:    1. Other microscopic hematuria (Primary)  -     POC Urinalysis Dipstick, Automated    Follow-up in 1 year or sooner if needed.  She will call if she has any gross hematuria.        Follow Up       No follow-ups on file.  Patient was given instructions and counseling regarding her condition or for " health maintenance advice. Please see specific information pulled into the AVS if appropriate.

## 2024-09-17 ENCOUNTER — OFFICE VISIT (OUTPATIENT)
Dept: INTERNAL MEDICINE | Age: 69
End: 2024-09-17
Payer: MEDICARE

## 2024-09-17 VITALS
BODY MASS INDEX: 28.53 KG/M2 | HEART RATE: 81 BPM | SYSTOLIC BLOOD PRESSURE: 102 MMHG | DIASTOLIC BLOOD PRESSURE: 62 MMHG | TEMPERATURE: 97.4 F | OXYGEN SATURATION: 97 % | HEIGHT: 63 IN | WEIGHT: 161 LBS

## 2024-09-17 DIAGNOSIS — J30.1 NON-SEASONAL ALLERGIC RHINITIS DUE TO POLLEN: ICD-10-CM

## 2024-09-17 DIAGNOSIS — Z23 NEED FOR SHINGLES VACCINE: ICD-10-CM

## 2024-09-17 DIAGNOSIS — Z86.018 HISTORY OF PITUITARY ADENOMA: ICD-10-CM

## 2024-09-17 DIAGNOSIS — E55.9 VITAMIN D DEFICIENCY: ICD-10-CM

## 2024-09-17 DIAGNOSIS — R31.29 OTHER MICROSCOPIC HEMATURIA: ICD-10-CM

## 2024-09-17 DIAGNOSIS — K76.0 FATTY LIVER: ICD-10-CM

## 2024-09-17 DIAGNOSIS — R92.30 DENSE BREASTS: ICD-10-CM

## 2024-09-17 DIAGNOSIS — Z29.11 NEED FOR RSV VACCINATION: ICD-10-CM

## 2024-09-17 DIAGNOSIS — M67.40 GANGLION CYST: ICD-10-CM

## 2024-09-17 DIAGNOSIS — E78.2 HYPERLIPEMIA, MIXED: Primary | ICD-10-CM

## 2024-09-17 DIAGNOSIS — R00.2 PALPITATIONS: ICD-10-CM

## 2024-09-17 PROCEDURE — G2211 COMPLEX E/M VISIT ADD ON: HCPCS | Performed by: INTERNAL MEDICINE

## 2024-09-17 PROCEDURE — 1160F RVW MEDS BY RX/DR IN RCRD: CPT | Performed by: INTERNAL MEDICINE

## 2024-09-17 PROCEDURE — 99214 OFFICE O/P EST MOD 30 MIN: CPT | Performed by: INTERNAL MEDICINE

## 2024-09-17 PROCEDURE — 1159F MED LIST DOCD IN RCRD: CPT | Performed by: INTERNAL MEDICINE

## 2024-09-17 RX ORDER — ZOSTER VACCINE RECOMBINANT, ADJUVANTED 50 MCG/0.5
0.5 KIT INTRAMUSCULAR ONCE
Qty: 1 EACH | Refills: 0 | Status: SHIPPED | OUTPATIENT
Start: 2024-09-17 | End: 2024-09-17

## 2024-10-04 DIAGNOSIS — J45.901 ASTHMA EXACERBATION, MILD: Primary | ICD-10-CM

## 2024-10-04 RX ORDER — ALBUTEROL SULFATE 90 UG/1
2 INHALANT RESPIRATORY (INHALATION) EVERY 4 HOURS PRN
Qty: 8 G | Refills: 1 | Status: SHIPPED | OUTPATIENT
Start: 2024-10-04

## 2024-10-04 NOTE — TELEPHONE ENCOUNTER
RF REQUEST    Medication(s) -  Albuterol Pro Inhaler  [facility]    Rfs appropriate - sending

## 2024-10-24 ENCOUNTER — OFFICE VISIT (OUTPATIENT)
Dept: ORTHOPEDIC SURGERY | Facility: CLINIC | Age: 69
End: 2024-10-24
Payer: MEDICARE

## 2024-10-24 VITALS
OXYGEN SATURATION: 97 % | WEIGHT: 159 LBS | HEART RATE: 71 BPM | BODY MASS INDEX: 28.17 KG/M2 | SYSTOLIC BLOOD PRESSURE: 110 MMHG | DIASTOLIC BLOOD PRESSURE: 71 MMHG | HEIGHT: 63 IN

## 2024-10-24 DIAGNOSIS — M67.40 GANGLION CYST: ICD-10-CM

## 2024-10-24 DIAGNOSIS — M79.642 LEFT HAND PAIN: Primary | ICD-10-CM

## 2024-10-24 RX ORDER — MELOXICAM 7.5 MG/1
7.5 TABLET ORAL DAILY
Qty: 30 TABLET | Refills: 0 | Status: SHIPPED | OUTPATIENT
Start: 2024-10-24

## 2024-10-24 NOTE — PROGRESS NOTES
"Chief Complaint  Initial Evaluation of the Left Hand     Subjective      Archana Delvalle presents to [facility] for initial evaluation of the left hand. She has ganglion cysts on the palm, 2 nd 4 th and 5 th digits at the PIP joints.  She has some pain with the cysts.      Allergies   Allergen Reactions    Codeine Rash    Levaquin [Levofloxacin] Diarrhea    Singulair [Montelukast Sodium] Nausea Only    Tetracycline Rash        Social History     Socioeconomic History    Marital status: Single   Tobacco Use    Smoking status: Never     Passive exposure: Never    Smokeless tobacco: Never   Vaping Use    Vaping status: Never Used   Substance and Sexual Activity    Alcohol use: Never     Comment: Current some day, drinks rarely; wine    Drug use: Never    Sexual activity: Defer        I reviewed the patient's chief complaint, history of present illness, review of systems, past medical history, surgical history, family history, social history, medications, and allergy list.     Review of Systems     Constitutional: Denies fevers, chills, weight loss  Cardiovascular: Denies chest pain, shortness of breath  Skin: Denies rashes, acute skin changes  Neurologic: Denies headache, loss of consciousness        Vital Signs:   /71   Pulse 71   Ht 160 cm (62.99\")   Wt 72.1 kg (159 lb)   SpO2 97%   BMI 28.17 kg/m²          Physical Exam  General: Alert. No acute distress    Ortho Exam        LEFT HAND She has ganglion cysts on the palm, 2 nd, 4 th and 5 th digits.   Full ROM of the hand, fingers, elbow and wrist.  Positive AIN, PIN and ulnar nerve motor function intact. Axillary nerve intact. Positive pulses.       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   Prior studies available for comparison: no        Assessment and Plan     Diagnoses and all orders for this visit:    1. Left hand pain (Primary)  -     XR Hand 2 View Left    2. Ganglion cyst        Discussed the treatment plan with the patient. I reviewed the X-rays that were obtained today with the patient.     Prescribed anti inflammatory.  Discussed Tumeric.        Call or return if worsening symptoms.    Follow Up     PRN      Patient was given instructions and counseling regarding her condition or for health maintenance advice. Please see specific information pulled into the AVS if appropriate.     Scribed for Samantha Sousa MD by Namrata Christensen MA.  10/24/24   08:54 EDT    I have personally performed the services described in this document as scribed by the above individual and it is both accurate and complete. Samantha Sousa MD 10/24/24

## 2025-01-22 NOTE — PROGRESS NOTES
[facility]  Cardiology progress Note    Patient Name: Archana Delvalle  : 1955    CHIEF COMPLAINT  Palpitations        Subjective   Subjective     HISTORY OF PRESENT ILLNESS    Archana Delvalle is a 69 y.o. female with history of palpitations.  No further palpitations.    REVIEW OF SYSTEMS    Constitutional:    No fever, no weight loss  Skin:     No rash  Otolaryngeal:    No difficulty swallowing  Cardiovascular: See HPI.  Pulmonary:    No cough, no sputum production    Personal History     Social History:    reports that she has never smoked. She has never been exposed to tobacco smoke. She has never used smokeless tobacco. She reports that she does not drink alcohol and does not use drugs.    Home Medications:  Current Outpatient Medications on File Prior to Visit   Medication Sig    albuterol sulfate  (90 Base) MCG/ACT inhaler Inhale 2 puffs Every 4 (Four) Hours As Needed for Wheezing or Shortness of Air.    calcium carbonate (OS-JOSIE) 600 MG tablet Take 1 tablet by mouth Daily.    cholecalciferol (VITAMIN D3) 25 MCG (1000 UT) tablet Take 1 tablet by mouth Daily.    Cyanocobalamin (Vitamin B 12) 100 MCG lozenge Take  by mouth.    metoprolol succinate XL (TOPROL-XL) 25 MG 24 hr tablet TAKE 1/2 TABLET BY MOUTH DAILY    multivitamin with minerals tablet tablet Take 1 tablet by mouth Daily.    Omega-3 Fatty Acids (fish oil) 1000 MG capsule capsule Take 1 capsule by mouth 2 (Two) Times a Day With Meals.    rosuvastatin (CRESTOR) 10 MG tablet TAKE 1/2 TABLET BY MOUTH EVERY DAY    [DISCONTINUED] cetirizine (ZyrTEC) 5 MG chewable tablet Chew 1 tablet Daily. prn (Patient not taking: Reported on 2025)    [DISCONTINUED] meloxicam (Mobic) 7.5 MG tablet Take 1 tablet by mouth Daily. (Patient not taking: Reported on 2025)     No current facility-administered medications on file prior to visit.       Past Medical History:   Diagnosis Date    Asthma     Bronchitis     High cholesterol      Tachycardia        Allergies:  Allergies   Allergen Reactions    Codeine Rash    Levaquin [Levofloxacin] Diarrhea    Singulair [Montelukast Sodium] Nausea Only    Tetracycline Rash       Objective    Objective       Vitals:   Heart Rate:  [77] 77  BP: (116)/(66) 116/66  Body mass index is 29.63 kg/m².     PHYSICAL EXAM:    General Appearance:   well developed  well nourished  HENT:   oropharynx moist  lips not cyanotic  Neck:  thyroid not enlarged  supple  Respiratory:  no respiratory distress  normal breath sounds  no rales  Cardiovascular:  no jugular venous distention  regular rhythm  apical impulse normal  S1 normal, S2 normal  no S3, no S4   no murmur  no rub, no thrill  carotid pulses normal; no bruit  pedal pulses normal  lower extremity edema: none    Skin:   warm, dry  Psychiatric:  judgement and insight appropriate  normal mood and affect        Result Review:  I have personally reviewed the available results from  [x]  Laboratory  [x]  EKG  [x]  Cardiology  [x]  Medications  [x]  Old records  []  Other:       ECG 12 Lead    Date/Time: 1/28/2025 10:50 AM  Performed by: Brennen Joe MD    Authorized by: Brennen Joe MD  Comparison: compared with previous ECG   Rate: normal  QRS axis: normal  Comments: Normal sinus rhythm.  No significant changes compared to previous EKG.        Lab Results   Component Value Date    CHOL 149 01/24/2025    CHOL 144 08/30/2024    CHOL 161 04/04/2024     Lab Results   Component Value Date    TRIG 212 (H) 01/24/2025    TRIG 171 (H) 08/30/2024    TRIG 215 (H) 04/04/2024     Lab Results   Component Value Date    HDL 45 01/24/2025    HDL 43 08/30/2024    HDL 45 04/04/2024     Lab Results   Component Value Date    LDL 69 01/24/2025    LDL 72 08/30/2024    LDL 80 04/04/2024     Lab Results   Component Value Date    VLDL 35 01/24/2025    VLDL 29 08/30/2024    VLDL 36 04/04/2024     Results for orders placed during the hospital encounter of 04/27/22    Adult  Transthoracic Echo Complete W/ Cont if Necessary Per Protocol    Interpretation Summary  Normal left ventricular systolic function.  No significant valvular abnormalities noted.     Impression/Plan:  1.  Palpitations/tachycardia stable: Continue Toprol-XL 12.5 mg once a day.  2.  Mixed hyperlipidemia: Continue Crestor 5 mg once a day.  Monitor lipid and hepatic profile.           Brennen Joe MD   01/28/25   10:49 EST

## 2025-01-24 ENCOUNTER — LAB (OUTPATIENT)
Facility: HOSPITAL | Age: 70
End: 2025-01-24
Payer: MEDICARE

## 2025-01-24 DIAGNOSIS — R00.2 PALPITATIONS: ICD-10-CM

## 2025-01-24 DIAGNOSIS — Z29.11 NEED FOR RSV VACCINATION: ICD-10-CM

## 2025-01-24 DIAGNOSIS — E55.9 VITAMIN D DEFICIENCY: ICD-10-CM

## 2025-01-24 DIAGNOSIS — Z23 NEED FOR SHINGLES VACCINE: ICD-10-CM

## 2025-01-24 DIAGNOSIS — M67.40 GANGLION CYST: ICD-10-CM

## 2025-01-24 DIAGNOSIS — R31.29 OTHER MICROSCOPIC HEMATURIA: ICD-10-CM

## 2025-01-24 DIAGNOSIS — E78.2 HYPERLIPEMIA, MIXED: ICD-10-CM

## 2025-01-24 DIAGNOSIS — K76.0 FATTY LIVER: ICD-10-CM

## 2025-01-24 DIAGNOSIS — Z86.018 HISTORY OF PITUITARY ADENOMA: ICD-10-CM

## 2025-01-24 DIAGNOSIS — J30.1 NON-SEASONAL ALLERGIC RHINITIS DUE TO POLLEN: ICD-10-CM

## 2025-01-24 LAB
25(OH)D3 SERPL-MCNC: 43.3 NG/ML (ref 30–100)
ALBUMIN SERPL-MCNC: 4.4 G/DL (ref 3.5–5.2)
ALBUMIN UR-MCNC: <1.2 MG/DL
ALBUMIN/GLOB SERPL: 1.6 G/DL
ALP SERPL-CCNC: 53 U/L (ref 39–117)
ALT SERPL W P-5'-P-CCNC: 12 U/L (ref 1–33)
ANION GAP SERPL CALCULATED.3IONS-SCNC: 9 MMOL/L (ref 5–15)
AST SERPL-CCNC: 21 U/L (ref 1–32)
BASOPHILS # BLD AUTO: 0.04 10*3/MM3 (ref 0–0.2)
BASOPHILS NFR BLD AUTO: 0.8 % (ref 0–1.5)
BILIRUB SERPL-MCNC: 0.3 MG/DL (ref 0–1.2)
BUN SERPL-MCNC: 11 MG/DL (ref 8–23)
BUN/CREAT SERPL: 14.9 (ref 7–25)
CALCIUM SPEC-SCNC: 9.2 MG/DL (ref 8.6–10.5)
CHLORIDE SERPL-SCNC: 104 MMOL/L (ref 98–107)
CHOLEST SERPL-MCNC: 149 MG/DL (ref 0–200)
CO2 SERPL-SCNC: 27 MMOL/L (ref 22–29)
CREAT SERPL-MCNC: 0.74 MG/DL (ref 0.57–1)
DEPRECATED RDW RBC AUTO: 43.5 FL (ref 37–54)
EGFRCR SERPLBLD CKD-EPI 2021: 88.8 ML/MIN/1.73
EOSINOPHIL # BLD AUTO: 0.09 10*3/MM3 (ref 0–0.4)
EOSINOPHIL NFR BLD AUTO: 1.8 % (ref 0.3–6.2)
ERYTHROCYTE [DISTWIDTH] IN BLOOD BY AUTOMATED COUNT: 12.1 % (ref 12.3–15.4)
FOLATE SERPL-MCNC: >20 NG/ML (ref 4.78–24.2)
GLOBULIN UR ELPH-MCNC: 2.8 GM/DL
GLUCOSE SERPL-MCNC: 92 MG/DL (ref 65–99)
HCT VFR BLD AUTO: 39.9 % (ref 34–46.6)
HDLC SERPL-MCNC: 45 MG/DL (ref 40–60)
HGB BLD-MCNC: 13.2 G/DL (ref 12–15.9)
IMM GRANULOCYTES # BLD AUTO: 0.01 10*3/MM3 (ref 0–0.05)
IMM GRANULOCYTES NFR BLD AUTO: 0.2 % (ref 0–0.5)
LDLC SERPL CALC-MCNC: 69 MG/DL (ref 0–100)
LDLC/HDLC SERPL: 1.37 {RATIO}
LYMPHOCYTES # BLD AUTO: 1.69 10*3/MM3 (ref 0.7–3.1)
LYMPHOCYTES NFR BLD AUTO: 34.4 % (ref 19.6–45.3)
MAGNESIUM SERPL-MCNC: 2.2 MG/DL (ref 1.6–2.4)
MCH RBC QN AUTO: 32.3 PG (ref 26.6–33)
MCHC RBC AUTO-ENTMCNC: 33.1 G/DL (ref 31.5–35.7)
MCV RBC AUTO: 97.6 FL (ref 79–97)
MONOCYTES # BLD AUTO: 0.47 10*3/MM3 (ref 0.1–0.9)
MONOCYTES NFR BLD AUTO: 9.6 % (ref 5–12)
NEUTROPHILS NFR BLD AUTO: 2.61 10*3/MM3 (ref 1.7–7)
NEUTROPHILS NFR BLD AUTO: 53.2 % (ref 42.7–76)
NRBC BLD AUTO-RTO: 0 /100 WBC (ref 0–0.2)
PLATELET # BLD AUTO: 188 10*3/MM3 (ref 140–450)
PMV BLD AUTO: 12.8 FL (ref 6–12)
POTASSIUM SERPL-SCNC: 4 MMOL/L (ref 3.5–5.2)
PROT SERPL-MCNC: 7.2 G/DL (ref 6–8.5)
RBC # BLD AUTO: 4.09 10*6/MM3 (ref 3.77–5.28)
SODIUM SERPL-SCNC: 140 MMOL/L (ref 136–145)
T3FREE SERPL-MCNC: 3.1 PG/ML (ref 2–4.4)
T4 FREE SERPL-MCNC: 1 NG/DL (ref 0.92–1.68)
TRIGL SERPL-MCNC: 212 MG/DL (ref 0–150)
TSH SERPL DL<=0.05 MIU/L-ACNC: 2.83 UIU/ML (ref 0.27–4.2)
VIT B12 BLD-MCNC: 813 PG/ML (ref 211–946)
VLDLC SERPL-MCNC: 35 MG/DL (ref 5–40)
WBC NRBC COR # BLD AUTO: 4.91 10*3/MM3 (ref 3.4–10.8)

## 2025-01-24 PROCEDURE — 82306 VITAMIN D 25 HYDROXY: CPT

## 2025-01-24 PROCEDURE — 85025 COMPLETE CBC W/AUTO DIFF WBC: CPT

## 2025-01-24 PROCEDURE — 83735 ASSAY OF MAGNESIUM: CPT

## 2025-01-24 PROCEDURE — 84443 ASSAY THYROID STIM HORMONE: CPT

## 2025-01-24 PROCEDURE — 36415 COLL VENOUS BLD VENIPUNCTURE: CPT

## 2025-01-24 PROCEDURE — 82746 ASSAY OF FOLIC ACID SERUM: CPT

## 2025-01-24 PROCEDURE — 82043 UR ALBUMIN QUANTITATIVE: CPT

## 2025-01-24 PROCEDURE — 82607 VITAMIN B-12: CPT

## 2025-01-24 PROCEDURE — 80061 LIPID PANEL: CPT

## 2025-01-24 PROCEDURE — 80053 COMPREHEN METABOLIC PANEL: CPT

## 2025-01-24 PROCEDURE — 84439 ASSAY OF FREE THYROXINE: CPT

## 2025-01-24 PROCEDURE — 84481 FREE ASSAY (FT-3): CPT

## 2025-01-28 ENCOUNTER — OFFICE VISIT (OUTPATIENT)
Dept: CARDIOLOGY | Facility: CLINIC | Age: 70
End: 2025-01-28
Payer: MEDICARE

## 2025-01-28 VITALS
SYSTOLIC BLOOD PRESSURE: 116 MMHG | DIASTOLIC BLOOD PRESSURE: 66 MMHG | HEIGHT: 62 IN | HEART RATE: 77 BPM | WEIGHT: 162 LBS | BODY MASS INDEX: 29.81 KG/M2

## 2025-01-28 DIAGNOSIS — E78.2 HYPERLIPEMIA, MIXED: ICD-10-CM

## 2025-01-28 DIAGNOSIS — R00.2 PALPITATIONS: Primary | ICD-10-CM

## 2025-01-28 PROCEDURE — 93000 ELECTROCARDIOGRAM COMPLETE: CPT | Performed by: SPECIALIST

## 2025-01-28 PROCEDURE — 99214 OFFICE O/P EST MOD 30 MIN: CPT | Performed by: SPECIALIST

## 2025-03-03 RX ORDER — ROSUVASTATIN CALCIUM 10 MG/1
5 TABLET, COATED ORAL DAILY
Qty: 45 TABLET | Refills: 3 | Status: SHIPPED | OUTPATIENT
Start: 2025-03-03

## 2025-03-11 ENCOUNTER — OFFICE VISIT (OUTPATIENT)
Dept: INTERNAL MEDICINE | Age: 70
End: 2025-03-11
Payer: MEDICARE

## 2025-03-11 VITALS
OXYGEN SATURATION: 97 % | TEMPERATURE: 98.4 F | WEIGHT: 165.4 LBS | SYSTOLIC BLOOD PRESSURE: 116 MMHG | DIASTOLIC BLOOD PRESSURE: 68 MMHG | HEIGHT: 62 IN | RESPIRATION RATE: 18 BRPM | HEART RATE: 71 BPM | BODY MASS INDEX: 30.44 KG/M2

## 2025-03-11 DIAGNOSIS — R35.0 URINARY FREQUENCY: ICD-10-CM

## 2025-03-11 DIAGNOSIS — E78.2 HYPERLIPEMIA, MIXED: Primary | ICD-10-CM

## 2025-03-11 DIAGNOSIS — Z12.31 SCREENING MAMMOGRAM FOR BREAST CANCER: ICD-10-CM

## 2025-03-11 DIAGNOSIS — M81.0 AGE-RELATED OSTEOPOROSIS WITHOUT CURRENT PATHOLOGICAL FRACTURE: ICD-10-CM

## 2025-03-11 DIAGNOSIS — Z29.11 NEED FOR RSV VACCINATION: ICD-10-CM

## 2025-03-11 DIAGNOSIS — Z83.3 FAMILY HISTORY OF DIABETES MELLITUS IN MOTHER: ICD-10-CM

## 2025-03-11 DIAGNOSIS — Z13.1 SCREENING FOR DIABETES MELLITUS: ICD-10-CM

## 2025-03-11 DIAGNOSIS — N30.00 ACUTE CYSTITIS WITHOUT HEMATURIA: ICD-10-CM

## 2025-03-11 DIAGNOSIS — Z86.018 HISTORY OF PITUITARY ADENOMA: ICD-10-CM

## 2025-03-11 PROBLEM — R31.29 OTHER MICROSCOPIC HEMATURIA: Status: RESOLVED | Noted: 2023-03-30 | Resolved: 2025-03-11

## 2025-03-11 LAB
BILIRUB BLD-MCNC: NEGATIVE MG/DL
CLARITY, POC: ABNORMAL
COLOR UR: YELLOW
GLUCOSE UR STRIP-MCNC: NEGATIVE MG/DL
KETONES UR QL: NEGATIVE
LEUKOCYTE EST, POC: ABNORMAL
NITRITE UR-MCNC: NEGATIVE MG/ML
PH UR: 6 [PH] (ref 5–8)
PROT UR STRIP-MCNC: NEGATIVE MG/DL
RBC # UR STRIP: NEGATIVE /UL
SP GR UR: 1.01 (ref 1–1.03)
UROBILINOGEN UR QL: NORMAL

## 2025-03-11 PROCEDURE — 82043 UR ALBUMIN QUANTITATIVE: CPT | Performed by: INTERNAL MEDICINE

## 2025-03-11 PROCEDURE — 87086 URINE CULTURE/COLONY COUNT: CPT | Performed by: INTERNAL MEDICINE

## 2025-03-11 RX ORDER — NITROFURANTOIN 25; 75 MG/1; MG/1
100 CAPSULE ORAL 2 TIMES DAILY
Qty: 14 CAPSULE | Refills: 0 | Status: SHIPPED | OUTPATIENT
Start: 2025-03-11

## 2025-03-11 NOTE — ASSESSMENT & PLAN NOTE
She had pituitary adenoma resected by Dr. Leahy in [place].  It was picked up incidentally on an MRI checking for sinusitis by ENT.  Pituitary test were  normal  this year.  Assessment: Status post pituitary adenoma resection.  Clinically stable.  Follows with neurosurgeon   Plan-follow-up with-Dr. Leahy as scheduled-appointment December 2024

## 2025-03-11 NOTE — ASSESSMENT & PLAN NOTE
Lipid panel is excellent.-no myalgias-    Assessment: Hyperlipidemia on rosuvastatin.  Plan: Rosuvastatin 10 mg at bedtime  Can continue with fish oil 2 daily

## 2025-03-11 NOTE — PATIENT INSTRUCTIONS
Continue -can take B12 1 x/week    Use nitrofuraontoin 100 mg one 2x/d for 7 days -Push fluids    Recommend RSV at pharmacy and MMR/ Shingrix vaccines    Take fish oil 2 in am and 2 in pm    Do labs in 6 months

## 2025-03-11 NOTE — PROGRESS NOTES
CHIEF COMPLAINT  Archana Delvalle presents to [facility] for follow-up of Primary Care Follow-Up (69 year old female here today for a 4 month follow up. Pt states she believes she may have a uti. She's having some urinary frequency ).    HPI  69-year-old patient with cholesterol, AR, osteoporosis among others  here for routine follow-up and lab review  Also with urinary frequency UA at clinic with 2+ leukocytes nitrite negative no blood sent for culture      Records reviewed, meds reviewed in detail, labs reviewed with patient.  Plan of care is as follows below.    [facility]-Reviewed  ROS-frequency      Current Outpatient Medications:     albuterol sulfate  (90 Base) MCG/ACT inhaler, Inhale 2 puffs Every 4 (Four) Hours As Needed for Wheezing or Shortness of Air., Disp: 8 g, Rfl: 1    calcium carbonate (OS-JOSIE) 600 MG tablet, Take 1 tablet by mouth Daily., Disp: , Rfl:     cholecalciferol (VITAMIN D3) 25 MCG (1000 UT) tablet, Take 1 tablet by mouth Daily., Disp: , Rfl:     Cyanocobalamin (Vitamin B 12) 100 MCG lozenge, Take  by mouth., Disp: , Rfl:     metoprolol succinate XL (TOPROL-XL) 25 MG 24 hr tablet, TAKE 1/2 TABLET BY MOUTH DAILY, Disp: 45 tablet, Rfl: 3    multivitamin with minerals tablet tablet, Take 1 tablet by mouth Daily., Disp: , Rfl:     Omega-3 Fatty Acids (fish oil) 1000 MG capsule capsule, Take 1 capsule by mouth 2 (Two) Times a Day With Meals., Disp: 180 capsule, Rfl: 1    rosuvastatin (CRESTOR) 10 MG tablet, TAKE 1/2 TABLET BY MOUTH DAILY, Disp: 45 tablet, Rfl: 3    nitrofurantoin, macrocrystal-monohydrate, (Macrobid) 100 MG capsule, Take 1 capsule by mouth 2 (Two) Times a Day., Disp: 14 capsule, Rfl: 0    RSVPreF3 Vac Recomb Adjuvanted (AREXVY) 120 MCG/0.5ML reconstituted suspension injection, Inject 0.5 mL into the appropriate muscle as directed by prescriber 1 (One) Time for 1 dose., Disp: 0.5 mL, Rfl: 0   PFSH reviewed.      OBJECTIVE  Vital  "Signs  Vitals:    03/11/25 1128   BP: 116/68   BP Location: Left arm   Patient Position: Sitting   Cuff Size: Small Adult   Pulse: 71   Resp: 18   Temp: 98.4 °F (36.9 °C)   TempSrc: Temporal   SpO2: 97%   Weight: 75 kg (165 lb 6.4 oz)   Height: 157.4 cm (61.97\")      Body mass index is 30.28 kg/m².    Physical Exam  Vitals and nursing note reviewed.   Constitutional:       Appearance: Normal appearance.   HENT:      Head: Normocephalic and atraumatic.   Cardiovascular:      Rate and Rhythm: Normal rate and regular rhythm.   Pulmonary:      Effort: Pulmonary effort is normal.      Breath sounds: Normal breath sounds.   Abdominal:      Palpations: Abdomen is soft.   Musculoskeletal:      Cervical back: Normal range of motion and neck supple.   Neurological:      General: No focal deficit present.      Mental Status: She is alert and oriented to person, place, and time.          RESULTS REVIEW  No results found for: \"PROBNP\", \"BNP\"  CMP          4/4/2024    09:03 8/30/2024    08:46 1/24/2025    08:22   CMP   Glucose 93  93  92    BUN 16  13  11    Creatinine 0.82  0.80  0.74    EGFR 79.0  80.9  88.8    Sodium 140  140  140    Potassium 4.0  4.1  4.0    Chloride 103  104  104    Calcium 9.0  9.6  9.2    Total Protein 7.3  7.4  7.2    Albumin 4.6  4.6  4.4    Globulin 2.7  2.8  2.8    Total Bilirubin 0.4  0.5  0.3    Alkaline Phosphatase 53  46  53    AST (SGOT) 24  25  21    ALT (SGPT) 14  17  12    Albumin/Globulin Ratio 1.7  1.6  1.6    BUN/Creatinine Ratio 19.5  16.3  14.9    Anion Gap 11.9  10.7  9.0      CBC w/diff          4/4/2024    09:03 8/30/2024    08:46 1/24/2025    08:22   CBC w/Diff   WBC 5.52  4.58  4.91    RBC 4.41  4.12  4.09    Hemoglobin 14.1  13.1  13.2    Hematocrit 42.1  39.9  39.9    MCV 95.5  96.8  97.6    MCH 32.0  31.8  32.3    MCHC 33.5  32.8  33.1    RDW 12.1  12.3  12.1    Platelets 205  186  188    Neutrophil Rel % 53.6  50.9  53.2    Immature Granulocyte Rel % 0.0  0.2  0.2    Lymphocyte " Rel % 35.7  36.7  34.4    Monocyte Rel % 8.2  10.0  9.6    Eosinophil Rel % 1.6  1.5  1.8    Basophil Rel % 0.9  0.7  0.8       Lipid Panel          4/4/2024    09:03 8/30/2024    08:46 1/24/2025    08:22   Lipid Panel   Total Cholesterol 161  144  149    Triglycerides 215  171  212    HDL Cholesterol 45  43  45    VLDL Cholesterol 36  29  35    LDL Cholesterol  80  72  69    LDL/HDL Ratio 1.62  1.55  1.37       Lab Results   Component Value Date    TSH 2.830 01/24/2025    TSH 3.370 08/30/2024    TSH 1.960 04/04/2024      Lab Results   Component Value Date    FREET4 1.00 01/24/2025    FREET4 1.04 08/30/2024    FREET4 1.00 04/04/2024         Lab Results   Component Value Date    QYKXDEGO21 813 01/24/2025    JBRD65PR 43.3 01/24/2025    MG 2.2 01/24/2025        No Images in the past 120 days found..             ASSESSMENT & PLAN  Diagnoses and all orders for this visit:    1. Hyperlipemia, mixed (Primary)  Assessment & Plan:  Lipid panel is excellent.-no myalgias-    Assessment: Hyperlipidemia on rosuvastatin.  Plan: Rosuvastatin 10 mg at bedtime  Can continue with fish oil 2 daily    Orders:  -     Comprehensive Metabolic Panel; Future  -     Lipid Panel; Future  -     TSH+Free T4; Future  -     T3, Free; Future  -     Vitamin B12; Future  -     Folate; Future  -     Magnesium; Future  -     Vitamin D,25-Hydroxy; Future  -     CBC & Differential; Future  -     MicroAlbumin, Urine, Random - Urine, Clean Catch; Future  -     Hemoglobin A1c; Future    2. Acute cystitis without hematuria  Comments:  use macrobid one BID x 7 days-push fluids  Orders:  -     nitrofurantoin, macrocrystal-monohydrate, (Macrobid) 100 MG capsule; Take 1 capsule by mouth 2 (Two) Times a Day.  Dispense: 14 capsule; Refill: 0  -     Comprehensive Metabolic Panel; Future  -     Lipid Panel; Future  -     TSH+Free T4; Future  -     T3, Free; Future  -     Vitamin B12; Future  -     Folate; Future  -     Magnesium; Future  -     Vitamin D,25-Hydroxy;  Future  -     CBC & Differential; Future  -     MicroAlbumin, Urine, Random - Urine, Clean Catch; Future  -     Hemoglobin A1c; Future    3. History of pituitary adenoma  Assessment & Plan:  She had pituitary adenoma resected by Dr. Leahy in [place].  It was picked up incidentally on an MRI checking for sinusitis by ENT.  Pituitary test were  normal  this year.  Assessment: Status post pituitary adenoma resection.  Clinically stable.  Follows with neurosurgeon   Plan-follow-up with-Dr. Leahy as scheduled-appointment December 2024    Orders:  -     Comprehensive Metabolic Panel; Future  -     Lipid Panel; Future  -     TSH+Free T4; Future  -     T3, Free; Future  -     Vitamin B12; Future  -     Folate; Future  -     Magnesium; Future  -     Vitamin D,25-Hydroxy; Future  -     CBC & Differential; Future  -     MicroAlbumin, Urine, Random - Urine, Clean Catch; Future  -     Hemoglobin A1c; Future    4. Age-related osteoporosis without current pathological fracture  -     Comprehensive Metabolic Panel; Future  -     Lipid Panel; Future  -     TSH+Free T4; Future  -     T3, Free; Future  -     Vitamin B12; Future  -     Folate; Future  -     Magnesium; Future  -     Vitamin D,25-Hydroxy; Future  -     CBC & Differential; Future  -     MicroAlbumin, Urine, Random - Urine, Clean Catch; Future  -     Hemoglobin A1c; Future    5. Screening mammogram for breast cancer  -     Mammo Screening Digital Tomosynthesis Bilateral With CAD; Future  -     Comprehensive Metabolic Panel; Future  -     Lipid Panel; Future  -     TSH+Free T4; Future  -     T3, Free; Future  -     Vitamin B12; Future  -     Folate; Future  -     Magnesium; Future  -     Vitamin D,25-Hydroxy; Future  -     CBC & Differential; Future  -     MicroAlbumin, Urine, Random - Urine, Clean Catch; Future  -     Hemoglobin A1c; Future    6. Urinary frequency  -     POCT urinalysis dipstick, manual  -     nitrofurantoin, macrocrystal-monohydrate, (Macrobid) 100 MG  capsule; Take 1 capsule by mouth 2 (Two) Times a Day.  Dispense: 14 capsule; Refill: 0  -     Comprehensive Metabolic Panel; Future  -     Lipid Panel; Future  -     TSH+Free T4; Future  -     T3, Free; Future  -     Vitamin B12; Future  -     Folate; Future  -     Magnesium; Future  -     Vitamin D,25-Hydroxy; Future  -     CBC & Differential; Future  -     MicroAlbumin, Urine, Random - Urine, Clean Catch; Future  -     Hemoglobin A1c; Future    7. Need for RSV vaccination  -     RSVPreF3 Vac Recomb Adjuvanted (AREXVY) 120 MCG/0.5ML reconstituted suspension injection; Inject 0.5 mL into the appropriate muscle as directed by prescriber 1 (One) Time for 1 dose.  Dispense: 0.5 mL; Refill: 0  -     Comprehensive Metabolic Panel; Future  -     Lipid Panel; Future  -     TSH+Free T4; Future  -     T3, Free; Future  -     Vitamin B12; Future  -     Folate; Future  -     Magnesium; Future  -     Vitamin D,25-Hydroxy; Future  -     CBC & Differential; Future  -     MicroAlbumin, Urine, Random - Urine, Clean Catch; Future  -     Hemoglobin A1c; Future    8. Screening for diabetes mellitus  -     Comprehensive Metabolic Panel; Future  -     Lipid Panel; Future  -     TSH+Free T4; Future  -     T3, Free; Future  -     Vitamin B12; Future  -     Folate; Future  -     Magnesium; Future  -     Vitamin D,25-Hydroxy; Future  -     CBC & Differential; Future  -     MicroAlbumin, Urine, Random - Urine, Clean Catch; Future  -     Hemoglobin A1c; Future    9. Family history of diabetes mellitus in mother  -     Comprehensive Metabolic Panel; Future  -     Lipid Panel; Future  -     TSH+Free T4; Future  -     T3, Free; Future  -     Vitamin B12; Future  -     Folate; Future  -     Magnesium; Future  -     Vitamin D,25-Hydroxy; Future  -     CBC & Differential; Future  -     MicroAlbumin, Urine, Random - Urine, Clean Catch; Future  -     Hemoglobin A1c; Future            Plan-3/11/2025    Patient Instructions   Continue -can take B12 1  x/week    Use nitrofuraontoin 100 mg one 2x/d for 7 days -Push fluids    Recommend RSV at pharmacy and MMR/ Shingrix vaccines    Take fish oil 2 in am and 2 in pm    Do labs in 6 months     FOLLOW UP  Return in about 6 months (around 9/8/2025) for Recheck, Medicare Wellness.    Older Notes  9/17/2024 visit     9/17/24  Patient Instructions   Recommend Shingrix and flu shot, and RSV vaccine  Refer to Dr Sousa for cyst left palm  \Back on multivitamins to 5 days a week due to increased folate      FOLLOW UP  Return in about 4 months (around 1/17/2025) for Recheck. Labs prior     Patient was given instructions and counseling regarding her condition or for health maintenance advice. Please see specific information pulled into the AVS if appropriate.         Older notes  Patient Instructions for 1024   Increase exercise from 3 days to 5 d/week -treadmill  Due for covid vaccine again -last one 10/2023-   Schedule mammogram due this year  DEXA scan due February 2025     Follow Up   Return in about 5 months (around 9/10/2024) for Recheck.     4/10/2024 visit  being seen today for f/u of chol, AR, osteoporosis among others and labs     Process taking calcium 1200 mg daily  Chol- LDL 80 HDL 45 Tchol 161  AR-stable with meds      Older notes  Patient Instructions 11/10/23    Fish oil -100 mg 2x/d  Mucinex 2x/d prn congestion  Consider evista/fosamax-0ake 1200 mg daily  Vit D daily 1000 mg daily  Rec RSV vaccine  Myfitness pal savana         11/10/23 visit   here for 4 month check up and review of labs and above concerns     Congestion x 3 days     Vit D def-=38     Osteoporosis-new dx--declined meds-taking Ca/Vit D-take daily and vit D daily     Chol--elev Taking meds     AR-stable      Instructions7/20/23   Return in about 3 months (around 10/20/2023) for Recheck.  Continue with vitamin D 1000 units -goal is 30-40-in addition to calcium/vit D-can take 3x/week  Discussed Evista at next visit again-declined meds  Continue with  multivitamins once a day  Do labs in 3 months 1 week prior to appointment            7/20/23 visit  here for checkup and review of labs     Hematuria work-up underway through urology clinic seen June 27, 2023-for cystoscopy with Dr. Diaz-no dysuria or frequency     Chol-stable with meds     Records reviewed, meds reviewed in detail, labs reviewed with patient.  Plan of care is as follows below.      PMH- No chest pain or shortness of breath now.  No more recent palpitations.  She had bilateral cataract extractions done the most recent one was done since her last visit by Dr. Gomez.  The other would on the right with stents in 2019.  She seemed very well.  COVID illness in November 2020 before vaccines.  Up-to-date on COVID vaccines.  History of MVP and occasional palpitations and has seen cardiology periodically for that.  Negative colonoscopy in 2018.  History of mild asthma and allergies.  Had pulmonary functions in 2020 that showed no evidence of rash obstruction.  No response to bronchodilator and no restriction.  Diffusing capacity was normal.  FEV1/FVC was 82%.  FEV1 was 110% predicted.  She is to have a repeat colonoscopy in 2023 for follow-up on 1 TA polyp in March 2018.  Perennial allergies to dust and molds.  Previous allergy evaluation.  She has been on Flonase and Zyrtec in the past.  Has ProAir to use as needed.     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 Please see specific information pulled into the AVS if appropriate.

## 2025-03-12 LAB
ALBUMIN UR-MCNC: <1.2 MG/DL
BACTERIA SPEC AEROBE CULT: NORMAL

## 2025-05-23 RX ORDER — METOPROLOL SUCCINATE 25 MG/1
12.5 TABLET, EXTENDED RELEASE ORAL DAILY
Qty: 45 TABLET | Refills: 3 | Status: SHIPPED | OUTPATIENT
Start: 2025-05-23

## 2025-07-15 ENCOUNTER — OFFICE VISIT (OUTPATIENT)
Dept: INTERNAL MEDICINE | Age: 70
End: 2025-07-15
Payer: MEDICARE

## 2025-07-15 ENCOUNTER — HOSPITAL ENCOUNTER (OUTPATIENT)
Dept: MRI IMAGING | Facility: HOSPITAL | Age: 70
Discharge: HOME | End: 2025-07-15
Admitting: INTERNAL MEDICINE
Payer: MEDICARE

## 2025-07-15 VITALS
DIASTOLIC BLOOD PRESSURE: 72 MMHG | BODY MASS INDEX: 28.24 KG/M2 | SYSTOLIC BLOOD PRESSURE: 125 MMHG | HEIGHT: 63 IN | OXYGEN SATURATION: 97 % | RESPIRATION RATE: 16 BRPM | TEMPERATURE: 97.6 F | WEIGHT: 159.4 LBS | HEART RATE: 84 BPM

## 2025-07-15 DIAGNOSIS — M54.40 ACUTE RIGHT-SIDED LOW BACK PAIN WITH SCIATICA, SCIATICA LATERALITY UNSPECIFIED: Primary | ICD-10-CM

## 2025-07-15 DIAGNOSIS — M54.40 ACUTE RIGHT-SIDED LOW BACK PAIN WITH SCIATICA, SCIATICA LATERALITY UNSPECIFIED: ICD-10-CM

## 2025-07-15 DIAGNOSIS — E78.2 HYPERLIPEMIA, MIXED: ICD-10-CM

## 2025-07-15 DIAGNOSIS — E66.3 OVERWEIGHT (BMI 25.0-29.9): ICD-10-CM

## 2025-07-15 PROCEDURE — 99214 OFFICE O/P EST MOD 30 MIN: CPT | Performed by: INTERNAL MEDICINE

## 2025-07-15 PROCEDURE — G2211 COMPLEX E/M VISIT ADD ON: HCPCS | Performed by: INTERNAL MEDICINE

## 2025-07-15 PROCEDURE — 72148 MRI LUMBAR SPINE W/O DYE: CPT

## 2025-07-15 RX ORDER — TIZANIDINE HYDROCHLORIDE 4 MG/1
CAPSULE, GELATIN COATED ORAL
Qty: 20 CAPSULE | Refills: 0 | Status: SHIPPED | OUTPATIENT
Start: 2025-07-15

## 2025-07-15 NOTE — PATIENT INSTRUCTIONS
Can use tizanidine muscle relaxant 4 mg at night as needed for muscle pain    Use over-the-counter Advil/ibuprofen 2 to 3 tablets every 6 hours as needed for pain-with food    MRI of the back to be scheduled  [facility]    F/u 1-2 weeks

## 2025-07-15 NOTE — PROGRESS NOTES
CHIEF COMPLAINT  Archana Delvalle presents to [facility] to Back Pain (Went to the beach recently. The last night they were there, she was carrying an umbrella and a chair and stepped up to go into their room and felt a sharp pain in her lower right back pain. Hurts all the way down into her ankle. Went to Urgent Care and got a steroid injection. Felt so bad Saturday that she went to a chiropractor. He would not touch her. Tried Tylenol. Sometimes it helps. Doesn't seem to hurt as bad when she stands. From the knee to the ankle, she feels like someone has stretched her mus)     HPI    History of Present Illness  The patient is a 70-year-old female with underlying allergic rhinitis, hyperlipidemia, and osteoporosis, presenting with concerns of back pain radiating down her right leg with paresthesias.    She was seen at the urgent clinic and diagnosed with right-sided sciatica. X-rays of the hip and right foot were normal, showing no acute abnormalities. Symptoms started about 2 weeks ago. She recounts an incident during her beach vacation in [place] where she experienced sudden, severe pain in her right hip while walking on sand. The pain extended from her hip to her knee and down the lateral side of her leg, accompanied by a tingling sensation in her foot. She reports no numbness. Despite not having any falls or injuries, the pain persisted. She sought relief through heat and ice applications. The following day, she noticed pain in her right thigh and knee, which later subsided, but the hip and lower leg pain continued. An x-ray of her foot was performed at an urgent care center, which showed no fractures. She received a steroid injection, which unfortunately did not alleviate her symptoms. A chiropractor suggested that her symptoms might be due to sciatica and advised her to consult her primary care physician and get an MRI. She reports no lower back pain but experiences pain in  her right buttock. The pain intensifies when she lies down at night, disrupting her sleep. She also reports mild numbness in her right foot, lower leg, and toes. She rates her pain as 4 or 5 during the day and 7 at night. She has been taking Tylenol for pain relief, but it has not been effective. She was advised against taking Advil due to her heart condition. She has no history of heart attacks.    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  Plan of care is as follows below.    [facility]  ROS-back pain    Past History:  Allergies: Codeine, Levaquin [levofloxacin], Singulair [montelukast sodium], and Tetracycline   Medical History: has a past medical history of Asthma, Bronchitis, High cholesterol, and Tachycardia.   Surgical History: has a past surgical history that includes Breast biopsy;  section; Colonoscopy; Esophagogastroduodenoscopy; Endometrial ablation; Other surgical history; Eye surgery (Left, 2022); Pituitary surgery (); and Colonoscopy (N/A, 2023).   Family History: family history includes Arrhythmia in her father; Diabetes in her father; Prostate cancer in her father.   Social History: reports that she has never smoked. She has never been exposed to tobacco smoke. She has never used smokeless tobacco. She reports that she does not drink alcohol and does not use drugs.  Social History     Social History Narrative    Not on file         Current Outpatient Medications:     albuterol sulfate  (90 Base) MCG/ACT inhaler, Inhale 2 puffs Every 4 (Four) Hours As Needed for Wheezing or Shortness of Air., Disp: 8 g, Rfl: 1    calcium carbonate (OS-JOSIE) 600 MG tablet, Take 1 tablet by mouth Daily., Disp: , Rfl:     cholecalciferol (VITAMIN D3) 25 MCG (1000 UT) tablet, Take 1 tablet by mouth Daily., Disp: , Rfl:     Cyanocobalamin (Vitamin  "B 12) 100 MCG lozenge, Take  by mouth., Disp: , Rfl:     metoprolol succinate XL (TOPROL-XL) 25 MG 24 hr tablet, TAKE 1/2 TABLET BY MOUTH DAILY, Disp: 45 tablet, Rfl: 3    multivitamin with minerals tablet tablet, Take 1 tablet by mouth Daily., Disp: , Rfl:     Omega-3 Fatty Acids (fish oil) 1000 MG capsule capsule, Take 1 capsule by mouth 2 (Two) Times a Day With Meals., Disp: 180 capsule, Rfl: 1    rosuvastatin (CRESTOR) 10 MG tablet, TAKE 1/2 TABLET BY MOUTH DAILY, Disp: 45 tablet, Rfl: 3    TiZANidine (ZANAFLEX) 4 MG capsule, One tab po q hs prn muscle pain, Disp: 20 capsule, Rfl: 0     OBJECTIVE  Vital Signs  Vitals:    07/15/25 1241   BP: 125/72   BP Location: Left arm   Patient Position: Sitting   Cuff Size: Large Adult   Pulse: 84   Resp: 16   Temp: 97.6 °F (36.4 °C)   TempSrc: Temporal   SpO2: 97%   Weight: 72.3 kg (159 lb 6.4 oz)   Height: 160 cm (63\")      Body mass index is 28.24 kg/m².    Physical Exam  Musculoskeletal exam was performed.      Physical Exam  Vitals and nursing note reviewed.   Constitutional:       Appearance: Normal appearance.   HENT:      Head: Normocephalic and atraumatic.   Cardiovascular:      Rate and Rhythm: Normal rate and regular rhythm.   Pulmonary:      Effort: Pulmonary effort is normal.      Breath sounds: Normal breath sounds.   Abdominal:      Palpations: Abdomen is soft.   Musculoskeletal:      Cervical back: Normal range of motion and neck supple.   Neurological:      General: No focal deficit present.      Mental Status: She is alert and oriented to person, place, and time.         RESULTS REVIEW  No results found for: \"PROBNP\", \"BNP\"  CMP          8/30/2024    08:46 1/24/2025    08:22   CMP   Glucose 93  92    BUN 13  11    Creatinine 0.80  0.74    EGFR 80.9  88.8    Sodium 140  140    Potassium 4.1  4.0    Chloride 104  104    Calcium 9.6  9.2    Total Protein 7.4  7.2    Albumin 4.6  4.4    Globulin 2.8  2.8    Total Bilirubin 0.5  0.3    Alkaline Phosphatase 46  " 53    AST (SGOT) 25  21    ALT (SGPT) 17  12    Albumin/Globulin Ratio 1.6  1.6    BUN/Creatinine Ratio 16.3  14.9    Anion Gap 10.7  9.0      CBC w/diff          8/30/2024    08:46 1/24/2025    08:22   CBC w/Diff   WBC 4.58  4.91    RBC 4.12  4.09    Hemoglobin 13.1  13.2    Hematocrit 39.9  39.9    MCV 96.8  97.6    MCH 31.8  32.3    MCHC 32.8  33.1    RDW 12.3  12.1    Platelets 186  188    Neutrophil Rel % 50.9  53.2    Immature Granulocyte Rel % 0.2  0.2    Lymphocyte Rel % 36.7  34.4    Monocyte Rel % 10.0  9.6    Eosinophil Rel % 1.5  1.8    Basophil Rel % 0.7  0.8       Lipid Panel          8/30/2024    08:46 1/24/2025    08:22   Lipid Panel   Total Cholesterol 144  149    Triglycerides 171  212    HDL Cholesterol 43  45    VLDL Cholesterol 29  35    LDL Cholesterol  72  69    LDL/HDL Ratio 1.55  1.37       Lab Results   Component Value Date    TSH 2.830 01/24/2025    TSH 3.370 08/30/2024    TSH 1.960 04/04/2024      Lab Results   Component Value Date    FREET4 1.00 01/24/2025    FREET4 1.04 08/30/2024    FREET4 1.00 04/04/2024            XR Hip With or Without Pelvis 2 - 3 View Right  Result Date: 7/1/2025  Impression: Normal. Electronically Signed: Aydin Boo MD  7/1/2025 3:24 PM EDT  Workstation ID: [id number]    XR Foot 3+ View Right  Result Date: 7/1/2025  Impression: No acute abnormality Electronically Signed: Malcolm Muhammad MD  7/1/2025 3:18 PM EDT  Workstation ID: [id number]         Results  Imaging  X-rays of the hip and right foot showed no acute abnormality.             ASSESSMENT & PLAN  Diagnoses and all orders for this visit:    1. Acute right-sided low back pain with sciatica, sciatica laterality unspecified (Primary)  -     MRI Lumbar Spine Without Contrast; Future  -     Ambulatory Referral to Physical Therapy for Evaluation & Treatment  -     TiZANidine (ZANAFLEX) 4 MG capsule; One tab po q hs prn muscle pain  Dispense: 20 capsule; Refill: 0    2. Hyperlipemia, mixed  Overview:  Lipid  panel is excellent.-no myalgias-     Assessment: Hyperlipidemia on rosuvastatin.  Plan: Rosuvastatin 10 mg at bedtime  Can continue with fish oil 2 daily      3. Overweight (BMI 25.0-29.9)  Overview:  Try intermittent fasting--no snacking at night-walk daily 30 min             Assessment & Plan  1. Right-sided sciatica.  Symptoms include back pain radiating down the right leg with paresthesias, starting about 2 weeks ago. X-rays of the hip and right foot were normal. Given her history of osteoporosis, an MRI of the back is necessary to rule out any spinal or nerve issues. Physical therapy is recommended. Tizanidine 4 mg at night as needed for muscle pain is prescribed. Over-the-counter Advil/ibuprofen 2 to 3 tablets every 6 hours as needed for pain is advised. An MRI of the back has been scheduled, and a referral to [facility] has been made. If the MRI results are normal, specific exercises will be provided.    2.  Hyperlipidemia stable with rosuvastatin 10 mg nightly and fish oil 2 daily.  Lipids are stable      Follow-up  The patient will follow up in 1 week.        BMI is >= 25 and <30. (Overweight) The following options were offered after discussion;: weight loss educational material (shared in after visit summary), exercise counseling/recommendations, and nutrition counseling/recommendations       Patient Instructions   Can use tizanidine muscle relaxant 4 mg at night as needed for muscle pain    Use over-the-counter Advil/ibuprofen 2 to 3 tablets every 6 hours as needed for pain-with food    MRI of the back to be scheduled  [facility]    F/u 1-2 weeks     Patient or patient representative verbalized consent for the use of Ambient Listening during the visit with  Karena Negron MD for chart documentation. 7/15/2025  13:12 EDT    FOLLOW UP  Return in about 1 week (around 7/22/2025) for Recheck.    Patient was given instructions and counseling  regarding her condition or for health maintenance advice. Please see specific information pulled into the AVS if appropriate.

## 2025-07-16 ENCOUNTER — TELEPHONE (OUTPATIENT)
Dept: INTERNAL MEDICINE | Age: 70
End: 2025-07-16

## 2025-07-17 ENCOUNTER — TELEPHONE (OUTPATIENT)
Dept: INTERNAL MEDICINE | Age: 70
End: 2025-07-17
Payer: MEDICARE

## 2025-07-17 NOTE — TELEPHONE ENCOUNTER
MRI with DDD-no herniation-Disc bulge lumbar area--awaiting PT/can also refer to Neurosurgery if not better--keep f/u appt with me

## 2025-07-22 ENCOUNTER — OFFICE VISIT (OUTPATIENT)
Dept: INTERNAL MEDICINE | Age: 70
End: 2025-07-22
Payer: MEDICARE

## 2025-07-22 VITALS
WEIGHT: 159 LBS | BODY MASS INDEX: 28.17 KG/M2 | OXYGEN SATURATION: 96 % | HEIGHT: 63 IN | HEART RATE: 68 BPM | TEMPERATURE: 97.4 F | DIASTOLIC BLOOD PRESSURE: 64 MMHG | SYSTOLIC BLOOD PRESSURE: 102 MMHG

## 2025-07-22 DIAGNOSIS — M51.371 DEGENERATION OF INTERVERTEBRAL DISC OF LUMBOSACRAL REGION WITH LOWER EXTREMITY PAIN: ICD-10-CM

## 2025-07-22 DIAGNOSIS — E78.2 HYPERLIPEMIA, MIXED: ICD-10-CM

## 2025-07-22 DIAGNOSIS — M54.40 ACUTE RIGHT-SIDED LOW BACK PAIN WITH SCIATICA, SCIATICA LATERALITY UNSPECIFIED: Primary | ICD-10-CM

## 2025-07-22 NOTE — PROGRESS NOTES
CHIEF COMPLAINT  Archana Delvalle presents to [facility] to Primary Care Follow-Up (1 week follow up and her hip has gotten better but her leg and lower leg above ankle and her big toe is numb )     HPI    History of Present Illness  The patient is a 70-year-old female who was seen 1 week ago for back pain, felt to be due to right-sided sciatica. She is here for a follow-up.    She reports an improvement in her condition, with the excruciating hip pain she experienced last Tuesday now reduced by approximately 85%. She has scheduled a physical therapy appointment for this Thursday. She has been performing exercises but is unsure of their effectiveness. She has been taking tizanidine, which she finds helpful, and Advil for the past two nights. She has not taken tizanidine for the last two days. She is considering chiropractic treatment or massage therapy as potential options.    She experiences no pain in her right lateral thigh or knee, but reports discomfort from her ankle to her big toe. She has been experiencing numbness in her big toe for over 3 weeks, which is causing her concern. She is due for an A1c test and is worried about the possibility of diabetes or peripheral artery disease, given her family history of the latter. She has been avoiding lifting anything heavy, including laundry, to prevent exacerbating her symptoms.    She has a renal cyst and is concerned that it might be causing blood in her urine. She had a scope done by Dr. Fernandez, who did not find anything abnormal in her bladder. She does not think her kidney was examined. She sees Dr. Fernandez for yearly checkups and has an appointment next month.    FAMILY HISTORY  Her mother had peripheral artery disease that started in her foot and progressively worsened.    MEDICATIONS  Current: Tizanidine, Advil      Records reviewed, meds reviewed in detail, labs reviewed with patient.  Plan of care is as follows  below.    [facility]-Reviewed  ROS-back pain    Past History:  Allergies: Codeine, Levaquin [levofloxacin], Singulair [montelukast sodium], and Tetracycline   Medical History: has a past medical history of Asthma, Bronchitis, High cholesterol, and Tachycardia.   Surgical History: has a past surgical history that includes Breast biopsy;  section; Colonoscopy; Esophagogastroduodenoscopy; Endometrial ablation; Other surgical history; Eye surgery (Left, 2022); Pituitary surgery (); and Colonoscopy (N/A, 2023).   Family History: family history includes Arrhythmia in her father; Diabetes in her father; Prostate cancer in her father.   Social History: reports that she has never smoked. She has never been exposed to tobacco smoke. She has never used smokeless tobacco. She reports that she does not drink alcohol and does not use drugs.  Social History     Social History Narrative    Not on file         Current Outpatient Medications:     albuterol sulfate  (90 Base) MCG/ACT inhaler, Inhale 2 puffs Every 4 (Four) Hours As Needed for Wheezing or Shortness of Air., Disp: 8 g, Rfl: 1    calcium carbonate (OS-JOSIE) 600 MG tablet, Take 1 tablet by mouth Daily., Disp: , Rfl:     cholecalciferol (VITAMIN D3) 25 MCG (1000 UT) tablet, Take 1 tablet by mouth Daily., Disp: , Rfl:     Cyanocobalamin (Vitamin B 12) 100 MCG lozenge, Take  by mouth. Twice a week, Disp: , Rfl:     metoprolol succinate XL (TOPROL-XL) 25 MG 24 hr tablet, TAKE 1/2 TABLET BY MOUTH DAILY, Disp: 45 tablet, Rfl: 3    multivitamin with minerals tablet tablet, Take 1 tablet by mouth Daily., Disp: , Rfl:     Omega-3 Fatty Acids (fish oil) 1000 MG capsule capsule, Take 1 capsule by mouth 2 (Two) Times a Day With Meals., Disp: 180 capsule, Rfl: 1    rosuvastatin (CRESTOR) 10 MG tablet, TAKE 1/2 TABLET BY MOUTH DAILY, Disp: 45 tablet, Rfl: 3    TiZANidine (ZANAFLEX) 4 MG capsule, One tab po q hs prn muscle pain, Disp: 20 capsule, Rfl: 0  "    OBJECTIVE  Vital Signs  Vitals:    07/22/25 1446   BP: 102/64   BP Location: Left arm   Patient Position: Sitting   Cuff Size: Small Adult   Pulse: 68   Temp: 97.4 °F (36.3 °C)   SpO2: 96%   Weight: 72.1 kg (159 lb)   Height: 160 cm (63\")      Body mass index is 28.17 kg/m².    Physical Exam  Back was examined.      Physical Exam  Vitals and nursing note reviewed.   Constitutional:       Appearance: Normal appearance.   HENT:      Head: Normocephalic and atraumatic.   Cardiovascular:      Rate and Rhythm: Normal rate and regular rhythm.   Pulmonary:      Effort: Pulmonary effort is normal.      Breath sounds: Normal breath sounds.   Abdominal:      Palpations: Abdomen is soft.   Musculoskeletal:      Cervical back: Normal range of motion and neck supple.   Neurological:      General: No focal deficit present.      Mental Status: She is alert and oriented to person, place, and time.         RESULTS REVIEW  No results found for: \"PROBNP\", \"BNP\"  CMP          8/30/2024    08:46 1/24/2025    08:22   CMP   Glucose 93  92    BUN 13  11    Creatinine 0.80  0.74    EGFR 80.9  88.8    Sodium 140  140    Potassium 4.1  4.0    Chloride 104  104    Calcium 9.6  9.2    Total Protein 7.4  7.2    Albumin 4.6  4.4    Globulin 2.8  2.8    Total Bilirubin 0.5  0.3    Alkaline Phosphatase 46  53    AST (SGOT) 25  21    ALT (SGPT) 17  12    Albumin/Globulin Ratio 1.6  1.6    BUN/Creatinine Ratio 16.3  14.9    Anion Gap 10.7  9.0      CBC w/diff          8/30/2024    08:46 1/24/2025    08:22   CBC w/Diff   WBC 4.58  4.91    RBC 4.12  4.09    Hemoglobin 13.1  13.2    Hematocrit 39.9  39.9    MCV 96.8  97.6    MCH 31.8  32.3    MCHC 32.8  33.1    RDW 12.3  12.1    Platelets 186  188    Neutrophil Rel % 50.9  53.2    Immature Granulocyte Rel % 0.2  0.2    Lymphocyte Rel % 36.7  34.4    Monocyte Rel % 10.0  9.6    Eosinophil Rel % 1.5  1.8    Basophil Rel % 0.7  0.8       Lipid Panel          8/30/2024    08:46 1/24/2025    08:22 "   Lipid Panel   Total Cholesterol 144  149    Triglycerides 171  212    HDL Cholesterol 43  45    VLDL Cholesterol 29  35    LDL Cholesterol  72  69    LDL/HDL Ratio 1.55  1.37       Lab Results   Component Value Date    TSH 2.830 01/24/2025    TSH 3.370 08/30/2024    TSH 1.960 04/04/2024      Lab Results   Component Value Date    FREET4 1.00 01/24/2025    FREET4 1.04 08/30/2024    FREET4 1.00 04/04/2024            MRI Lumbar Spine Without Contrast  Result Date: 7/15/2025  Impression: Multilevel degenerative changes, as detailed, without high-grade spinal canal or foraminal stenosis. Electronically Signed: Kris Au MD  7/15/2025 5:24 PM EDT  Workstation ID: [id number]    XR Hip With or Without Pelvis 2 - 3 View Right  Result Date: 7/1/2025  Impression: Normal. Electronically Signed: Aydin Boo MD  7/1/2025 3:24 PM EDT  Workstation ID: [id number]    XR Foot 3+ View Right  Result Date: 7/1/2025  Impression: No acute abnormality Electronically Signed: Malcolm Muhammad MD  7/1/2025 3:18 PM EDT  Workstation ID: [id number]         Results  Laboratory Studies  Blood sugar was 92 in January.    Imaging  X-rays of the hip and foot were normal. MRI revealed diffuse disk bulge minimal at L1-L2, L2-L3, L3-L4. There is diffuse disk bulge L4-L5 and L5-S1 with moderate foraminal narrowing no herniated disk seen. Multilevel degenerative changes without high grade spinal canal or foraminal stenosis noted.             ASSESSMENT & PLAN  Diagnoses and all orders for this visit:    1. Acute right-sided low back pain with sciatica, sciatica laterality unspecified (Primary)    2. Degeneration of intervertebral disc of lumbosacral region with lower extremity pain  -     Ambulatory Referral to Neurosurgery    3. Hyperlipemia, mixed  Overview:  Lipid panel is excellent.-no myalgias-     Assessment: Hyperlipidemia on rosuvastatin.  Plan: Rosuvastatin 10 mg at bedtime  Can continue with fish oil 2 daily             Assessment &  Plan  1. Right-sided sciatica.  The MRI results indicate no herniated disk but reveal some disk disease, suggesting mild protrusion without sufficient severity to cause a herniation. This could still result in symptoms. The numbness in her toe is likely due to one of the lumbar roots being affected. Her condition appears to be improving. She is advised to continue with physical therapy and avoid excessive lifting. The use of Advil or Aleve is deemed appropriate. A referral to neurosurgery has been made for further evaluation.    2. Lumbar disc disease.  The MRI shows diffuse disk bulge at L4-L5 and L5-S1 with moderate foraminal narrowing, indicating multilevel degenerative changes without high-grade spinal canal or foraminal stenosis. The numbness in her toe is likely due to the L5-S1 disk disease. She is advised to continue with physical therapy and monitor for any changes. If symptoms persist, further evaluation by neurosurgery will be necessary.    3. Renal cyst.  She has a renal cyst and is concerned that it might be causing blood in her urine. She had a scope done by Dr. Fernandez, who did not find anything abnormal in her bladder. She does not think her kidney was examined. She sees Dr. Fernandez for yearly checkups and has an appointment next month.    Follow-up  A follow-up visit is scheduled in 6 weeks.                Patient Instructions   Start physical therapy as scheduled this week  Can use Aleve or Advil as needed for pain not needing the tizanidine anymore    Refer to neurosurgery for further evaluation    Follow-up in 6 weeks for further evaluation sooner if not better or if symptoms worsen     Patient or patient representative verbalized consent for the use of Ambient Listening during the visit with  Karena Negron MD for chart documentation. 7/22/2025  16:52 EDT    FOLLOW UP  Return for Next scheduled follow up, Recheck.    Patient was given instructions and counseling regarding her  condition or for health maintenance advice. Please see specific information pulled into the AVS if appropriate.

## 2025-07-22 NOTE — PATIENT INSTRUCTIONS
Start physical therapy as scheduled this week  Can use Aleve or Advil as needed for pain not needing the tizanidine anymore    Refer to neurosurgery for further evaluation    Follow-up in 6 weeks for further evaluation sooner if not better or if symptoms worsen

## 2025-08-12 ENCOUNTER — OFFICE VISIT (OUTPATIENT)
Dept: CARDIOLOGY | Facility: CLINIC | Age: 70
End: 2025-08-12
Payer: MEDICARE

## 2025-08-12 VITALS
HEART RATE: 91 BPM | SYSTOLIC BLOOD PRESSURE: 111 MMHG | BODY MASS INDEX: 27.75 KG/M2 | WEIGHT: 156.6 LBS | HEIGHT: 63 IN | DIASTOLIC BLOOD PRESSURE: 71 MMHG

## 2025-08-12 DIAGNOSIS — R00.2 PALPITATIONS: Primary | ICD-10-CM

## 2025-08-12 DIAGNOSIS — E78.2 HYPERLIPEMIA, MIXED: ICD-10-CM

## 2025-08-12 PROCEDURE — 93000 ELECTROCARDIOGRAM COMPLETE: CPT | Performed by: SPECIALIST

## 2025-08-12 PROCEDURE — 1159F MED LIST DOCD IN RCRD: CPT | Performed by: SPECIALIST

## 2025-08-12 PROCEDURE — 1160F RVW MEDS BY RX/DR IN RCRD: CPT | Performed by: SPECIALIST

## 2025-08-12 PROCEDURE — 99214 OFFICE O/P EST MOD 30 MIN: CPT | Performed by: SPECIALIST

## 2025-08-14 ENCOUNTER — LAB (OUTPATIENT)
Facility: HOSPITAL | Age: 70
End: 2025-08-14
Payer: MEDICARE

## 2025-08-14 DIAGNOSIS — E78.2 HYPERLIPEMIA, MIXED: ICD-10-CM

## 2025-08-14 DIAGNOSIS — Z29.11 NEED FOR RSV VACCINATION: ICD-10-CM

## 2025-08-14 DIAGNOSIS — Z83.3 FAMILY HISTORY OF DIABETES MELLITUS IN MOTHER: ICD-10-CM

## 2025-08-14 DIAGNOSIS — R35.0 URINARY FREQUENCY: ICD-10-CM

## 2025-08-14 DIAGNOSIS — Z86.018 HISTORY OF PITUITARY ADENOMA: ICD-10-CM

## 2025-08-14 DIAGNOSIS — N30.00 ACUTE CYSTITIS WITHOUT HEMATURIA: ICD-10-CM

## 2025-08-14 DIAGNOSIS — Z12.31 SCREENING MAMMOGRAM FOR BREAST CANCER: ICD-10-CM

## 2025-08-14 DIAGNOSIS — M81.0 AGE-RELATED OSTEOPOROSIS WITHOUT CURRENT PATHOLOGICAL FRACTURE: ICD-10-CM

## 2025-08-14 DIAGNOSIS — Z13.1 SCREENING FOR DIABETES MELLITUS: ICD-10-CM

## 2025-08-14 LAB
25(OH)D3 SERPL-MCNC: 43.7 NG/ML (ref 30–100)
ALBUMIN SERPL-MCNC: 4.5 G/DL (ref 3.5–5.2)
ALBUMIN/GLOB SERPL: 1.6 G/DL
ALP SERPL-CCNC: 44 U/L (ref 39–117)
ALT SERPL W P-5'-P-CCNC: 12 U/L (ref 1–33)
ANION GAP SERPL CALCULATED.3IONS-SCNC: 10.5 MMOL/L (ref 5–15)
AST SERPL-CCNC: 21 U/L (ref 1–32)
BASOPHILS # BLD AUTO: 0.04 10*3/MM3 (ref 0–0.2)
BASOPHILS NFR BLD AUTO: 0.8 % (ref 0–1.5)
BILIRUB SERPL-MCNC: 0.5 MG/DL (ref 0–1.2)
BUN SERPL-MCNC: 17 MG/DL (ref 8–23)
BUN/CREAT SERPL: 21 (ref 7–25)
CALCIUM SPEC-SCNC: 9.5 MG/DL (ref 8.6–10.5)
CHLORIDE SERPL-SCNC: 103 MMOL/L (ref 98–107)
CHOLEST SERPL-MCNC: 151 MG/DL (ref 0–200)
CO2 SERPL-SCNC: 28.5 MMOL/L (ref 22–29)
CREAT SERPL-MCNC: 0.81 MG/DL (ref 0.57–1)
DEPRECATED RDW RBC AUTO: 46.3 FL (ref 37–54)
EGFRCR SERPLBLD CKD-EPI 2021: 79.2 ML/MIN/1.73
EOSINOPHIL # BLD AUTO: 0.06 10*3/MM3 (ref 0–0.4)
EOSINOPHIL NFR BLD AUTO: 1.2 % (ref 0.3–6.2)
ERYTHROCYTE [DISTWIDTH] IN BLOOD BY AUTOMATED COUNT: 12.4 % (ref 12.3–15.4)
FOLATE SERPL-MCNC: >20 NG/ML (ref 4.78–24.2)
GLOBULIN UR ELPH-MCNC: 2.9 GM/DL
GLUCOSE SERPL-MCNC: 93 MG/DL (ref 65–99)
HBA1C MFR BLD: 5.5 % (ref 4.8–5.6)
HCT VFR BLD AUTO: 41.1 % (ref 34–46.6)
HDLC SERPL-MCNC: 53 MG/DL (ref 40–60)
HGB BLD-MCNC: 13.4 G/DL (ref 12–15.9)
IMM GRANULOCYTES # BLD AUTO: 0.01 10*3/MM3 (ref 0–0.05)
IMM GRANULOCYTES NFR BLD AUTO: 0.2 % (ref 0–0.5)
LDLC SERPL CALC-MCNC: 77 MG/DL (ref 0–100)
LDLC/HDLC SERPL: 1.4 {RATIO}
LYMPHOCYTES # BLD AUTO: 1.94 10*3/MM3 (ref 0.7–3.1)
LYMPHOCYTES NFR BLD AUTO: 37.5 % (ref 19.6–45.3)
MAGNESIUM SERPL-MCNC: 2.2 MG/DL (ref 1.6–2.4)
MCH RBC QN AUTO: 32.7 PG (ref 26.6–33)
MCHC RBC AUTO-ENTMCNC: 32.6 G/DL (ref 31.5–35.7)
MCV RBC AUTO: 100.2 FL (ref 79–97)
MONOCYTES # BLD AUTO: 0.59 10*3/MM3 (ref 0.1–0.9)
MONOCYTES NFR BLD AUTO: 11.4 % (ref 5–12)
NEUTROPHILS NFR BLD AUTO: 2.53 10*3/MM3 (ref 1.7–7)
NEUTROPHILS NFR BLD AUTO: 48.9 % (ref 42.7–76)
NRBC BLD AUTO-RTO: 0 /100 WBC (ref 0–0.2)
PLATELET # BLD AUTO: 185 10*3/MM3 (ref 140–450)
PMV BLD AUTO: 12.5 FL (ref 6–12)
POTASSIUM SERPL-SCNC: 4.4 MMOL/L (ref 3.5–5.2)
PROT SERPL-MCNC: 7.4 G/DL (ref 6–8.5)
RBC # BLD AUTO: 4.1 10*6/MM3 (ref 3.77–5.28)
SODIUM SERPL-SCNC: 142 MMOL/L (ref 136–145)
T3FREE SERPL-MCNC: 2.57 PG/ML (ref 2–4.4)
T4 FREE SERPL-MCNC: 1.12 NG/DL (ref 0.92–1.68)
TRIGL SERPL-MCNC: 119 MG/DL (ref 0–150)
TSH SERPL DL<=0.05 MIU/L-ACNC: 2.52 UIU/ML (ref 0.27–4.2)
VIT B12 BLD-MCNC: 667 PG/ML (ref 211–946)
VLDLC SERPL-MCNC: 21 MG/DL (ref 5–40)
WBC NRBC COR # BLD AUTO: 5.17 10*3/MM3 (ref 3.4–10.8)

## 2025-08-14 PROCEDURE — 82746 ASSAY OF FOLIC ACID SERUM: CPT

## 2025-08-14 PROCEDURE — 83036 HEMOGLOBIN GLYCOSYLATED A1C: CPT

## 2025-08-14 PROCEDURE — 82306 VITAMIN D 25 HYDROXY: CPT

## 2025-08-14 PROCEDURE — 36415 COLL VENOUS BLD VENIPUNCTURE: CPT

## 2025-08-14 PROCEDURE — 84481 FREE ASSAY (FT-3): CPT

## 2025-08-14 PROCEDURE — 80061 LIPID PANEL: CPT

## 2025-08-14 PROCEDURE — 85025 COMPLETE CBC W/AUTO DIFF WBC: CPT

## 2025-08-14 PROCEDURE — 84439 ASSAY OF FREE THYROXINE: CPT

## 2025-08-14 PROCEDURE — 80053 COMPREHEN METABOLIC PANEL: CPT

## 2025-08-14 PROCEDURE — 82607 VITAMIN B-12: CPT

## 2025-08-14 PROCEDURE — 83735 ASSAY OF MAGNESIUM: CPT

## 2025-08-14 PROCEDURE — 84443 ASSAY THYROID STIM HORMONE: CPT

## 2025-08-19 PROCEDURE — 87086 URINE CULTURE/COLONY COUNT: CPT | Performed by: NURSE PRACTITIONER

## 2025-08-19 PROCEDURE — 87077 CULTURE AEROBIC IDENTIFY: CPT | Performed by: NURSE PRACTITIONER

## 2025-08-19 PROCEDURE — 81515 NFCT DS BV&VAGINITIS DNA ALG: CPT | Performed by: NURSE PRACTITIONER

## 2025-08-19 PROCEDURE — 87186 SC STD MICRODIL/AGAR DIL: CPT | Performed by: NURSE PRACTITIONER

## 2025-08-24 ENCOUNTER — TELEPHONE (OUTPATIENT)
Dept: INTERNAL MEDICINE | Age: 70
End: 2025-08-24
Payer: MEDICARE

## 2025-08-24 DIAGNOSIS — N30.00 ACUTE CYSTITIS WITHOUT HEMATURIA: Primary | ICD-10-CM

## 2025-08-28 ENCOUNTER — HOSPITAL ENCOUNTER (OUTPATIENT)
Dept: MAMMOGRAPHY | Facility: HOSPITAL | Age: 70
Discharge: HOME | End: 2025-08-28
Payer: MEDICARE

## 2025-08-28 ENCOUNTER — LAB (OUTPATIENT)
Facility: HOSPITAL | Age: 70
End: 2025-08-28
Payer: MEDICARE

## 2025-08-28 DIAGNOSIS — Z12.31 SCREENING MAMMOGRAM FOR BREAST CANCER: ICD-10-CM

## 2025-08-28 DIAGNOSIS — N30.00 ACUTE CYSTITIS WITHOUT HEMATURIA: ICD-10-CM

## 2025-08-28 LAB
BACTERIA UR QL AUTO: ABNORMAL /HPF
BILIRUB UR QL STRIP: NEGATIVE
CLARITY UR: CLEAR
COLOR UR: YELLOW
GLUCOSE UR STRIP-MCNC: NEGATIVE MG/DL
HGB UR QL STRIP.AUTO: NEGATIVE
HOLD SPECIMEN: NORMAL
HYALINE CASTS UR QL AUTO: ABNORMAL /LPF
KETONES UR QL STRIP: NEGATIVE
LEUKOCYTE ESTERASE UR QL STRIP.AUTO: ABNORMAL
NITRITE UR QL STRIP: NEGATIVE
PH UR STRIP.AUTO: 5.5 [PH] (ref 5–8)
PROT UR QL STRIP: NEGATIVE
RBC # UR STRIP: ABNORMAL /HPF
REF LAB TEST METHOD: ABNORMAL
SP GR UR STRIP: 1.02 (ref 1–1.03)
SQUAMOUS #/AREA URNS HPF: ABNORMAL /HPF
UROBILINOGEN UR QL STRIP: ABNORMAL
WBC # UR STRIP: ABNORMAL /HPF

## 2025-08-28 PROCEDURE — 87086 URINE CULTURE/COLONY COUNT: CPT

## 2025-08-28 PROCEDURE — 81001 URINALYSIS AUTO W/SCOPE: CPT

## 2025-08-28 PROCEDURE — 77063 BREAST TOMOSYNTHESIS BI: CPT

## 2025-08-28 PROCEDURE — 77067 SCR MAMMO BI INCL CAD: CPT

## 2025-08-29 ENCOUNTER — TELEPHONE (OUTPATIENT)
Dept: INTERNAL MEDICINE | Age: 70
End: 2025-08-29
Payer: MEDICARE

## 2025-08-30 LAB — BACTERIA SPEC AEROBE CULT: NO GROWTH

## (undated) DEVICE — CONN JET HYDRA H20 AUXILIARY DISP

## (undated) DEVICE — THE SINGLE USE ETRAP – POLYP TRAP IS USED FOR SUCTION RETRIEVAL OF ENDOSCOPICALLY REMOVED POLYPS.: Brand: ETRAP

## (undated) DEVICE — SNAR E/S POLYP SNAREMASTER OVL/10MM 2.8X2300MM YEL

## (undated) DEVICE — SOL IRRG H2O PL/BG 1000ML STRL

## (undated) DEVICE — LINER SURG CANSTR SXN S/RIGD 1500CC

## (undated) DEVICE — Device: Brand: DEFENDO AIR/WATER/SUCTION AND BIOPSY VALVE

## (undated) DEVICE — SOLIDIFIER LIQLOC PLS 1500CC BT

## (undated) DEVICE — Device